# Patient Record
Sex: FEMALE | Race: WHITE | HISPANIC OR LATINO | URBAN - METROPOLITAN AREA
[De-identification: names, ages, dates, MRNs, and addresses within clinical notes are randomized per-mention and may not be internally consistent; named-entity substitution may affect disease eponyms.]

---

## 2017-02-08 ENCOUNTER — APPOINTMENT (RX ONLY)
Dept: URBAN - METROPOLITAN AREA CLINIC 349 | Facility: CLINIC | Age: 57
Setting detail: DERMATOLOGY
End: 2017-02-08

## 2017-02-08 DIAGNOSIS — L21.8 OTHER SEBORRHEIC DERMATITIS: ICD-10-CM | Status: STABLE

## 2017-02-08 DIAGNOSIS — L82.0 INFLAMED SEBORRHEIC KERATOSIS: ICD-10-CM

## 2017-02-08 PROBLEM — L29.8 OTHER PRURITUS: Status: ACTIVE | Noted: 2017-02-08

## 2017-02-08 PROCEDURE — ? PRESCRIPTION

## 2017-02-08 PROCEDURE — 99213 OFFICE O/P EST LOW 20 MIN: CPT | Mod: 25

## 2017-02-08 PROCEDURE — ? LIQUID NITROGEN

## 2017-02-08 PROCEDURE — 17110 DESTRUCTION B9 LES UP TO 14: CPT

## 2017-02-08 PROCEDURE — ? COUNSELING

## 2017-02-08 PROCEDURE — ? RECOMMENDATIONS

## 2017-02-08 RX ORDER — CEPHALEXIN 500 MG/1
CAPSULE ORAL
Qty: 60 | Refills: 0 | Status: ERX | COMMUNITY
Start: 2017-02-08

## 2017-02-08 RX ORDER — CLOBETASOL PROPIONATE 0.46 MG/ML
SOLUTION TOPICAL
Qty: 1 | Refills: 4 | Status: ERX

## 2017-02-08 RX ORDER — ALCLOMETASONE DIPROPIONATE 0.5 MG/G
OINTMENT TOPICAL
Qty: 1 | Refills: 6 | Status: ERX | COMMUNITY
Start: 2017-02-08

## 2017-02-08 RX ADMIN — ALCLOMETASONE DIPROPIONATE: 0.5 OINTMENT TOPICAL at 00:00

## 2017-02-08 RX ADMIN — CEPHALEXIN: 500 CAPSULE ORAL at 00:00

## 2017-02-08 ASSESSMENT — LOCATION ZONE DERM: LOCATION ZONE: ARM

## 2017-02-08 ASSESSMENT — LOCATION DETAILED DESCRIPTION DERM
LOCATION DETAILED: LEFT PROXIMAL DORSAL FOREARM
LOCATION DETAILED: LEFT DISTAL DORSAL FOREARM

## 2017-02-08 ASSESSMENT — LOCATION SIMPLE DESCRIPTION DERM: LOCATION SIMPLE: LEFT FOREARM

## 2017-02-08 NOTE — PROCEDURE: LIQUID NITROGEN
Detail Level: Detailed
Duration Of Freeze Thaw-Cycle (Seconds): 3
Medical Necessity Information: It is in your best interest to select a reason for this procedure from the list below. All of these items fulfill various CMS LCD requirements except the new and changing color options.
Include Z78.9 (Other Specified Conditions Influencing Health Status) As An Associated Diagnosis?: Yes
Render Post-Care Instructions In Note?: no
Post-Care Instructions: I reviewed with the patient in detail post-care instructions. Patient is to wear sunprotection, and avoid picking at any of the treated lesions. Pt may apply Vaseline to crusted or scabbing areas.
Number Of Freeze-Thaw Cycles: 1 freeze-thaw cycle
Consent: The patient's consent was obtained including but not limited to risks of crusting, scabbing, blistering, scarring, darker or lighter pigmentary change, recurrence, incomplete removal and infection.
Medical Necessity Clause: This procedure was medically necessary because the lesions that were treated were:

## 2017-02-08 NOTE — PROCEDURE: RECOMMENDATIONS
Recommendation Preamble: Reassurance
Detail Level: Zone
Recommendations (Free Text): Head and shoulders with T gel alternating

## 2017-03-20 ENCOUNTER — APPOINTMENT (RX ONLY)
Dept: URBAN - METROPOLITAN AREA CLINIC 349 | Facility: CLINIC | Age: 57
Setting detail: DERMATOLOGY
End: 2017-03-20

## 2017-03-20 DIAGNOSIS — L21.8 OTHER SEBORRHEIC DERMATITIS: ICD-10-CM | Status: IMPROVED

## 2017-03-20 DIAGNOSIS — L82.1 OTHER SEBORRHEIC KERATOSIS: ICD-10-CM

## 2017-03-20 DIAGNOSIS — L82.0 INFLAMED SEBORRHEIC KERATOSIS: ICD-10-CM

## 2017-03-20 PROCEDURE — 17110 DESTRUCTION B9 LES UP TO 14: CPT

## 2017-03-20 PROCEDURE — ? LIQUID NITROGEN

## 2017-03-20 PROCEDURE — 99213 OFFICE O/P EST LOW 20 MIN: CPT | Mod: 25

## 2017-03-20 PROCEDURE — ? TREATMENT REGIMEN

## 2017-03-20 PROCEDURE — ? COUNSELING

## 2017-03-20 ASSESSMENT — LOCATION SIMPLE DESCRIPTION DERM
LOCATION SIMPLE: LEFT FOREHEAD
LOCATION SIMPLE: RIGHT UPPER BACK
LOCATION SIMPLE: RIGHT THIGH
LOCATION SIMPLE: FRONTAL SCALP
LOCATION SIMPLE: CHEST
LOCATION SIMPLE: LEFT UPPER ARM
LOCATION SIMPLE: POSTERIOR SCALP
LOCATION SIMPLE: RIGHT THIGH
LOCATION SIMPLE: LEFT UPPER BACK
LOCATION SIMPLE: CHEST

## 2017-03-20 ASSESSMENT — LOCATION DETAILED DESCRIPTION DERM
LOCATION DETAILED: LEFT SUPERIOR MEDIAL FOREHEAD
LOCATION DETAILED: LEFT ANTECUBITAL SKIN
LOCATION DETAILED: LOWER STERNUM
LOCATION DETAILED: RIGHT ANTERIOR PROXIMAL THIGH
LOCATION DETAILED: LOWER STERNUM
LOCATION DETAILED: RIGHT ANTERIOR PROXIMAL THIGH
LOCATION DETAILED: MEDIAL FRONTAL SCALP
LOCATION DETAILED: MIDDLE STERNUM
LOCATION DETAILED: RIGHT INFERIOR UPPER BACK
LOCATION DETAILED: LEFT MID-UPPER BACK
LOCATION DETAILED: RIGHT OCCIPITAL SCALP

## 2017-03-20 ASSESSMENT — LOCATION ZONE DERM
LOCATION ZONE: ARM
LOCATION ZONE: TRUNK
LOCATION ZONE: SCALP
LOCATION ZONE: TRUNK
LOCATION ZONE: LEG
LOCATION ZONE: FACE
LOCATION ZONE: LEG

## 2017-03-20 NOTE — PROCEDURE: LIQUID NITROGEN
Detail Level: Detailed
Medical Necessity Clause: This procedure was medically necessary because the lesions that were treated were:
Render Post-Care Instructions In Note?: no
Number Of Freeze-Thaw Cycles: 1 freeze-thaw cycle
Post-Care Instructions: I reviewed with the patient in detail post-care instructions. Patient is to wear sunprotection, and avoid picking at any of the treated lesions. Pt may apply Vaseline to crusted or scabbing areas.
Medical Necessity Information: It is in your best interest to select a reason for this procedure from the list below. All of these items fulfill various CMS LCD requirements except the new and changing color options.
Include Z78.9 (Other Specified Conditions Influencing Health Status) As An Associated Diagnosis?: Yes
Consent: The patient's consent was obtained including but not limited to risks of crusting, scabbing, blistering, scarring, darker or lighter pigmentary change, recurrence, incomplete removal and infection.

## 2017-12-29 ENCOUNTER — APPOINTMENT (RX ONLY)
Dept: URBAN - METROPOLITAN AREA CLINIC 349 | Facility: CLINIC | Age: 57
Setting detail: DERMATOLOGY
End: 2017-12-29

## 2017-12-29 DIAGNOSIS — L30.9 DERMATITIS, UNSPECIFIED: ICD-10-CM

## 2017-12-29 DIAGNOSIS — L29.89 OTHER PRURITUS: ICD-10-CM

## 2017-12-29 DIAGNOSIS — L29.8 OTHER PRURITUS: ICD-10-CM

## 2017-12-29 PROCEDURE — ? PRESCRIPTION

## 2017-12-29 PROCEDURE — 99213 OFFICE O/P EST LOW 20 MIN: CPT

## 2017-12-29 PROCEDURE — ? OTHER

## 2017-12-29 PROCEDURE — ? COUNSELING

## 2017-12-29 RX ORDER — TRIAMCINOLONE ACETONIDE 1 MG/G
CREAM TOPICAL
Qty: 1 | Refills: 1 | Status: ERX | COMMUNITY
Start: 2017-12-29

## 2017-12-29 RX ADMIN — TRIAMCINOLONE ACETONIDE: 1 CREAM TOPICAL at 13:50

## 2017-12-29 ASSESSMENT — LOCATION SIMPLE DESCRIPTION DERM
LOCATION SIMPLE: LEFT THIGH
LOCATION SIMPLE: LEFT LOWER BACK
LOCATION SIMPLE: RIGHT UPPER BACK
LOCATION SIMPLE: RIGHT THIGH
LOCATION SIMPLE: ABDOMEN

## 2017-12-29 ASSESSMENT — LOCATION DETAILED DESCRIPTION DERM
LOCATION DETAILED: PERIUMBILICAL SKIN
LOCATION DETAILED: LEFT SUPERIOR LATERAL MIDBACK
LOCATION DETAILED: LEFT ANTERIOR PROXIMAL THIGH
LOCATION DETAILED: RIGHT ANTERIOR PROXIMAL THIGH
LOCATION DETAILED: RIGHT INFERIOR UPPER BACK
LOCATION DETAILED: RIGHT LATERAL ABDOMEN

## 2017-12-29 ASSESSMENT — LOCATION ZONE DERM
LOCATION ZONE: TRUNK
LOCATION ZONE: LEG

## 2017-12-29 NOTE — PROCEDURE: OTHER
Detail Level: Zone
Note Text (......Xxx Chief Complaint.): This diagnosis correlates with the
Other (Free Text): Patient advised to apply Lubiderm lotion on top of the Triamcinalone twice daily for two weeks and to use all free and clear detergent to wash her clothes in.

## 2018-03-07 ENCOUNTER — APPOINTMENT (RX ONLY)
Dept: URBAN - METROPOLITAN AREA CLINIC 349 | Facility: CLINIC | Age: 58
Setting detail: DERMATOLOGY
End: 2018-03-07

## 2018-03-07 DIAGNOSIS — L71.8 OTHER ROSACEA: ICD-10-CM

## 2018-03-07 PROCEDURE — ? COUNSELING

## 2018-03-07 PROCEDURE — ? PRESCRIPTION

## 2018-03-07 PROCEDURE — 99213 OFFICE O/P EST LOW 20 MIN: CPT

## 2018-03-07 RX ORDER — MINOCYCLINE HYDROCHLORIDE 100 MG/1
CAPSULE ORAL
Qty: 30 | Refills: 1 | Status: ERX | COMMUNITY
Start: 2018-03-07

## 2018-03-07 RX ORDER — METRONIDAZOLE 7.5 MG/G
CREAM TOPICAL
Qty: 1 | Refills: 4 | Status: ERX | COMMUNITY
Start: 2018-03-07

## 2018-03-07 RX ADMIN — MINOCYCLINE HYDROCHLORIDE: 100 CAPSULE ORAL at 20:50

## 2018-03-07 RX ADMIN — METRONIDAZOLE: 7.5 CREAM TOPICAL at 20:49

## 2018-03-07 NOTE — HPI: SKIN LESIONS
Is This A New Presentation, Or A Follow-Up?: Skin Lesions
Additional History: Patient states Alclometasone didn’t help

## 2022-06-07 DIAGNOSIS — N95.1 MENOPAUSE SYNDROME: Primary | ICD-10-CM

## 2023-08-02 ENCOUNTER — TELEPHONE (OUTPATIENT)
Dept: ORTHOPEDIC SURGERY | Age: 63
End: 2023-08-02

## 2023-08-21 ENCOUNTER — OFFICE VISIT (OUTPATIENT)
Dept: ORTHOPEDIC SURGERY | Age: 63
End: 2023-08-21
Payer: COMMERCIAL

## 2023-08-21 DIAGNOSIS — S43.431A SUPERIOR GLENOID LABRUM LESION OF RIGHT SHOULDER, INITIAL ENCOUNTER: ICD-10-CM

## 2023-08-21 DIAGNOSIS — M47.812 CERVICAL SPONDYLOSIS: ICD-10-CM

## 2023-08-21 DIAGNOSIS — M75.21 BICIPITAL TENDINITIS OF RIGHT SHOULDER: ICD-10-CM

## 2023-08-21 DIAGNOSIS — M75.01 ADHESIVE CAPSULITIS OF RIGHT SHOULDER: ICD-10-CM

## 2023-08-21 DIAGNOSIS — M19.012 DEGENERATIVE JOINT DISEASE OF LEFT ACROMIOCLAVICULAR JOINT: ICD-10-CM

## 2023-08-21 DIAGNOSIS — M19.011 DEGENERATIVE JOINT DISEASE OF RIGHT ACROMIOCLAVICULAR JOINT: ICD-10-CM

## 2023-08-21 DIAGNOSIS — G89.29 CHRONIC LEFT SHOULDER PAIN: ICD-10-CM

## 2023-08-21 DIAGNOSIS — M25.512 CHRONIC LEFT SHOULDER PAIN: ICD-10-CM

## 2023-08-21 DIAGNOSIS — M75.31 CALCIFIC TENDINITIS OF RIGHT SHOULDER: Primary | ICD-10-CM

## 2023-08-21 PROCEDURE — 99204 OFFICE O/P NEW MOD 45 MIN: CPT | Performed by: ORTHOPAEDIC SURGERY

## 2023-08-21 NOTE — PROGRESS NOTES
Name: Luis Fernando Rahman  YOB: 1960  Gender: female  MRN: [id number]      What: Bilateral shoulder pain right greater than left, neck pain  How: Insidious onset  When: November 2022    Self-referred second opinion    HPI: Luis Fernando Rahman is a 65 y.o. right-hand-dominant female seen for a second opinion for right greater than left shoulder pain. She also has neck pain. She has a history of hypothyroidism and is currently on hormonal replacement therapy. She denies any previous shoulder problems. She denies any injury. She noted in November 2020 to the onset of right shoulder pain. The right shoulder is sore painful and slightly stiff. She also developed left shoulder pain. She initially seen by Dr. Claudeen Oram. She received a right shoulder subacromial injection without improvement. She was also given oral steroids. She was put through a course of physical therapy without improvement. She then saw Dr. Dominique Bernal. She had both shoulders injected. The left shoulder has gotten better. The right shoulder has not improved at all. It affects her quality of life. She has pain in the overhead position, pain at night, pain that wakes her up at night and pain when she goes behind her back to unsnap her bra. She is very active. She is [place] but she is attempting to get yoga certified in [place] in February 2024. She rides bikes. The right shoulder affects her quality of life. She had a previous left clavicle fracture      ROS/Meds/PSH/PMH/FH/SH: A ten system review of systems was performed and is negative other than what is in the HPI. Tobacco:  reports that she has never smoked. She has never used smokeless tobacco.  There were no vitals taken for this visit.      Physical Examination:  She is an awake alert pleasant female ambulating without difficulty  She has a slight restriction in cervical spine range of motion without radicular findings    The left shoulder has 0 to 180 degrees of

## 2023-08-26 DIAGNOSIS — M75.01 ADHESIVE CAPSULITIS OF RIGHT SHOULDER: ICD-10-CM

## 2023-08-26 DIAGNOSIS — S43.431A SUPERIOR GLENOID LABRUM LESION OF RIGHT SHOULDER, INITIAL ENCOUNTER: ICD-10-CM

## 2023-08-26 DIAGNOSIS — M75.31 CALCIFIC TENDINITIS OF RIGHT SHOULDER: Primary | ICD-10-CM

## 2023-08-26 DIAGNOSIS — M75.21 BICIPITAL TENDINITIS OF RIGHT SHOULDER: ICD-10-CM

## 2023-08-26 DIAGNOSIS — M19.011 DEGENERATIVE JOINT DISEASE OF RIGHT ACROMIOCLAVICULAR JOINT: ICD-10-CM

## 2023-08-28 PROBLEM — M75.31 CALCIFIC TENDINITIS OF RIGHT SHOULDER: Status: ACTIVE | Noted: 2023-08-28

## 2023-08-28 PROBLEM — M75.01 ADHESIVE CAPSULITIS OF RIGHT SHOULDER: Status: ACTIVE | Noted: 2023-08-28

## 2023-08-28 PROBLEM — M19.011 DEGENERATIVE JOINT DISEASE OF RIGHT ACROMIOCLAVICULAR JOINT: Status: ACTIVE | Noted: 2023-08-28

## 2023-08-28 PROBLEM — M75.21 BICIPITAL TENDINITIS OF RIGHT SHOULDER: Status: ACTIVE | Noted: 2023-08-28

## 2023-08-28 PROBLEM — S43.431A SUPERIOR GLENOID LABRUM LESION OF RIGHT SHOULDER: Status: ACTIVE | Noted: 2023-08-28

## 2023-09-24 NOTE — H&P
Subjective:     Patient is a 65 y.o. RHD FEMALE WITH RIGHT SHOULDER PAIN. SEE OFFICE NOTE. Patient Active Problem List    Diagnosis Date Noted    Calcific tendinitis of right shoulder 08/28/2023    Bicipital tendinitis of right shoulder 08/28/2023    Superior glenoid labrum lesion of right shoulder 08/28/2023    Adhesive capsulitis of right shoulder 08/28/2023    Degenerative joint disease of right acromioclavicular joint 08/28/2023    Hypothyroidism 06/01/2016     Past Medical History:   Diagnosis Date    ASCUS with positive high risk HPV cervical     Hepatitis A     age 12    Hypothyroidism 6/1/2016    Liver disease     Screening for colon cancer 6/20/2014      Past Surgical History:   Procedure Laterality Date    COLONOSCOPY  7/2014    REPEAT 10 YRS    COLPOSCOPY      9/29/20 for LGSIL    GYN      treatment on cervix years ago    ORTHOPEDIC SURGERY      right arm pins and then removed     OTHER SURGICAL HISTORY      Hemorrhoids      Prior to Admission medications    Medication Sig Start Date End Date Taking? Authorizing Provider   EST ESTROGENS-METHYLTEST HS 0.625-1.25 MG per tablet TAKE ONE TABLET BY MOUTH ONE TIME DAILY . MAXIMUM DAILY AMOUNT : 1 TABLET 6/8/22   Surjit Saldivar MD   estradiol (ESTRACE) 0.1 MG/GM vaginal cream Place 1 g vaginally Twice a Week 11/5/21   Ar Automatic Reconciliation   Meth-Hyo-M Bl-Na Phos-Ph Sal (URIBEL) 118 MG CAPS Take 1 capsule by mouth 4 times daily 11/4/21   Ar Automatic Reconciliation   progesterone (PROMETRIUM) 100 MG CAPS capsule TAKE ONE CAPSULE BY MOUTH ONE TIME DAILY 11/4/21   Ar Automatic Reconciliation     Not on File   Social History     Tobacco Use    Smoking status: Never    Smokeless tobacco: Never   Substance Use Topics    Alcohol use: Yes     Alcohol/week: 4.2 standard drinks of alcohol      Family History   Problem Relation Age of Onset    Heart Disease Father       Review of Systems  Pertinent items are noted in HPI.     Objective:     No data

## 2023-09-26 ENCOUNTER — APPOINTMENT (RX ONLY)
Dept: URBAN - METROPOLITAN AREA CLINIC 329 | Facility: CLINIC | Age: 63
Setting detail: DERMATOLOGY
End: 2023-09-26

## 2023-09-26 ENCOUNTER — PREP FOR PROCEDURE (OUTPATIENT)
Dept: ORTHOPEDIC SURGERY | Age: 63
End: 2023-09-26

## 2023-09-26 DIAGNOSIS — M75.31 CALCIFIC TENDINITIS OF RIGHT SHOULDER: Primary | ICD-10-CM

## 2023-09-26 DIAGNOSIS — D22 MELANOCYTIC NEVI: ICD-10-CM

## 2023-09-26 DIAGNOSIS — L65.9 NONSCARRING HAIR LOSS, UNSPECIFIED: ICD-10-CM | Status: UNCHANGED

## 2023-09-26 DIAGNOSIS — L81.4 OTHER MELANIN HYPERPIGMENTATION: ICD-10-CM

## 2023-09-26 DIAGNOSIS — L71.8 OTHER ROSACEA: ICD-10-CM | Status: INADEQUATELY CONTROLLED

## 2023-09-26 DIAGNOSIS — L72.0 EPIDERMAL CYST: ICD-10-CM | Status: INADEQUATELY CONTROLLED

## 2023-09-26 DIAGNOSIS — D18.0 HEMANGIOMA: ICD-10-CM

## 2023-09-26 DIAGNOSIS — L82.0 INFLAMED SEBORRHEIC KERATOSIS: ICD-10-CM | Status: INADEQUATELY CONTROLLED

## 2023-09-26 DIAGNOSIS — L82.1 OTHER SEBORRHEIC KERATOSIS: ICD-10-CM

## 2023-09-26 PROBLEM — D22.5 MELANOCYTIC NEVI OF TRUNK: Status: ACTIVE | Noted: 2023-09-26

## 2023-09-26 PROBLEM — D22.62 MELANOCYTIC NEVI OF LEFT UPPER LIMB, INCLUDING SHOULDER: Status: ACTIVE | Noted: 2023-09-26

## 2023-09-26 PROBLEM — D22.71 MELANOCYTIC NEVI OF RIGHT LOWER LIMB, INCLUDING HIP: Status: ACTIVE | Noted: 2023-09-26

## 2023-09-26 PROBLEM — D18.01 HEMANGIOMA OF SKIN AND SUBCUTANEOUS TISSUE: Status: ACTIVE | Noted: 2023-09-26

## 2023-09-26 PROCEDURE — ? TREATMENT REGIMEN

## 2023-09-26 PROCEDURE — ? ADDITIONAL NOTES

## 2023-09-26 PROCEDURE — 17110 DESTRUCTION B9 LES UP TO 14: CPT

## 2023-09-26 PROCEDURE — ? LIQUID NITROGEN

## 2023-09-26 PROCEDURE — ? BENIGN DESTRUCTION COSMETIC

## 2023-09-26 PROCEDURE — ? PRESCRIPTION

## 2023-09-26 PROCEDURE — 99204 OFFICE O/P NEW MOD 45 MIN: CPT | Mod: 25

## 2023-09-26 PROCEDURE — ? MDM - TREATMENT GOALS

## 2023-09-26 PROCEDURE — ? COUNSELING

## 2023-09-26 PROCEDURE — ? PRESCRIPTION MEDICATION MANAGEMENT

## 2023-09-26 RX ORDER — SODIUM CHLORIDE 9 MG/ML
INJECTION, SOLUTION INTRAVENOUS PRN
Status: CANCELLED | OUTPATIENT
Start: 2023-09-26

## 2023-09-26 RX ORDER — LEVOTHYROXINE SODIUM 88 UG/1
88 TABLET ORAL DAILY
COMMUNITY

## 2023-09-26 RX ORDER — SODIUM CHLORIDE 0.9 % (FLUSH) 0.9 %
5-40 SYRINGE (ML) INJECTION EVERY 12 HOURS SCHEDULED
Status: CANCELLED | OUTPATIENT
Start: 2023-09-26

## 2023-09-26 RX ORDER — METRONIDAZOLE 7.5 MG/G
GEL TOPICAL
Qty: 45 | Refills: 6 | Status: ACTIVE

## 2023-09-26 RX ORDER — SODIUM CHLORIDE 0.9 % (FLUSH) 0.9 %
5-40 SYRINGE (ML) INJECTION PRN
Status: CANCELLED | OUTPATIENT
Start: 2023-09-26

## 2023-09-26 ASSESSMENT — LOCATION DETAILED DESCRIPTION DERM
LOCATION DETAILED: EPIGASTRIC SKIN
LOCATION DETAILED: LEFT LATERAL SUPERIOR CHEST
LOCATION DETAILED: RIGHT MEDIAL FRONTAL SCALP
LOCATION DETAILED: LEFT SUPERIOR UPPER BACK
LOCATION DETAILED: RIGHT ANTERIOR DISTAL THIGH
LOCATION DETAILED: LEFT INFERIOR VERMILION BORDER
LOCATION DETAILED: RIGHT MEDIAL SUPERIOR CHEST
LOCATION DETAILED: LEFT LOWER CUTANEOUS LIP
LOCATION DETAILED: LEFT VENTRAL LATERAL DISTAL FOREARM
LOCATION DETAILED: RIGHT SUPERIOR MEDIAL UPPER BACK
LOCATION DETAILED: LEFT MEDIAL UPPER BACK
LOCATION DETAILED: LEFT SUPERIOR MEDIAL MIDBACK

## 2023-09-26 ASSESSMENT — LOCATION SIMPLE DESCRIPTION DERM
LOCATION SIMPLE: LEFT FOREARM
LOCATION SIMPLE: CHEST
LOCATION SIMPLE: RIGHT THIGH
LOCATION SIMPLE: LEFT LIP
LOCATION SIMPLE: LEFT LOWER BACK
LOCATION SIMPLE: RIGHT UPPER BACK
LOCATION SIMPLE: LEFT UPPER BACK
LOCATION SIMPLE: ABDOMEN
LOCATION SIMPLE: RIGHT SCALP
LOCATION SIMPLE: LEFT LOWER LIP

## 2023-09-26 ASSESSMENT — LOCATION ZONE DERM
LOCATION ZONE: SCALP
LOCATION ZONE: LIP
LOCATION ZONE: ARM
LOCATION ZONE: LEG
LOCATION ZONE: TRUNK

## 2023-09-26 NOTE — PROCEDURE: ADDITIONAL NOTES
Additional Notes: Discussed treatment with extraction, explained that this is a benign cosmetic destruction. Patient expressed understanding. Attempted to extract with an 11 blade, manual pressure and CTAs but was unsuccessful. May attempt again in the future
Render Risk Assessment In Note?: no
Detail Level: Simple
Additional Notes: Educated the patient that hair loss may be connected with her hypothyroidism and that by continuing her thyroid medication over an extended period of time, patient should notice an improvement. If the hair loss continues to worsen, discussed treatment options with patient.

## 2023-09-26 NOTE — HPI: SKIN LESION
What Type Of Note Output Would You Prefer (Optional)?: Bullet Format
How Severe Is Your Skin Lesion?: mild
Is This A New Presentation, Or A Follow-Up?: Skin Lesion
Additional History: Patent states that she has three areas of concern. One on her right cheek, as well as one on her lip and another lesion on the left side under her underarm. The lesions have been there for a while.

## 2023-09-26 NOTE — PERIOP NOTE
Patient verified name and . Order for consent IS found in EHR and matches case posting; patient verifies procedure. Type 1b surgery, phone assessment complete. Orders not received. Labs per surgeon: none in EHR at time of assessment  Labs per anesthesia protocol: none    Patient answered medical/surgical history questions at their best of ability. All prior to admission medications documented in EPIC. Patient instructed to take the following medications the day of surgery according to anesthesia guidelines with a small sip of water: Levothyroxine, estrogen, progesterone. On the day before surgery please take Tylenol (Acetaminophen) 1000mg in the morning and then again before bed. You may substitute for Tylenol 650 mg. Hold all vitamins 7 days prior to surgery and NSAIDS 5 days prior to surgery. Patient instructed on the following:    > Arrive at [facility], time of arrival to be called the day before by 1700  > NPO after midnight, unless otherwise indicated, including gum, mints, and ice chips  > Responsible adult must drive patient to the hospital, stay during surgery, and patient will need supervision 24 hours after anesthesia  > Use antibacterial soap in shower the night before surgery and on the morning of surgery  > All piercings must be removed prior to arrival.    > Leave all valuables (money and jewelry) at home but bring insurance card and ID on DOS.   > You may be required to pay a deductible or co-pay on the day of your procedure. You can pre-pay by calling [phone] if your surgery is at the [facility] or [phone] if your surgery is at the [facility]. > Do not wear make-up, nail polish, lotions, cologne, perfumes, powders, or oil on skin. Artificial nails are not permitted.

## 2023-09-26 NOTE — HPI: HAIR LOSS
How Did The Hair Loss Occur?: sudden in onset
How Severe Is Your Hair Loss?: mild
Additional History: Patient states she is struggling with hair loss. The hair loss started about 5 months ago and happened suddenly. Patient also got diagnosed with hypothyroidism and got prescribed medication around the same time as the hair loss started. Patient explained that she used to struggle with an oily scalp and got prescribed a medication for it.

## 2023-09-26 NOTE — PROCEDURE: LIQUID NITROGEN
Consent: The patient's consent was obtained including but not limited to risks of crusting, scabbing, blistering, scarring, darker or lighter pigmentary change, recurrence, incomplete removal and infection.
Medical Necessity Clause: This procedure was medically necessary because the lesions that were treated were:
Show Spray Paint Technique Variable?: Yes
Post-Care Instructions: I reviewed with the patient in detail post-care instructions. Patient is to wear sunprotection, and avoid picking at any of the treated lesions. Pt may apply Vaseline to crusted or scabbing areas.
Include Z78.9 (Other Specified Conditions Influencing Health Status) As An Associated Diagnosis?: No
Detail Level: Detailed
Spray Paint Text: The liquid nitrogen was applied to the skin utilizing a spray paint frosting technique.
Medical Necessity Information: It is in your best interest to select a reason for this procedure from the list below. All of these items fulfill various CMS LCD requirements except the new and changing color options.

## 2023-09-26 NOTE — PROCEDURE: BENIGN DESTRUCTION COSMETIC
Post-Care Instructions: I reviewed with the patient in detail post-care instructions. Patient is to wear sunprotection, and avoid picking at any of the treated lesions. Pt may apply Vaseline to crusted or scabbing areas.
Anesthesia Volume In Cc: 0.5
Detail Level: Detailed
Consent: The patient's consent was obtained including but not limited to risks of crusting, scabbing, blistering, scarring, darker or lighter pigmentary change, recurrence, incomplete removal and infection.
Price (Use Numbers Only, No Special Characters Or $): 75

## 2023-09-27 ENCOUNTER — ANESTHESIA EVENT (OUTPATIENT)
Dept: SURGERY | Age: 63
End: 2023-09-27
Payer: COMMERCIAL

## 2023-09-28 ENCOUNTER — ANESTHESIA (OUTPATIENT)
Dept: SURGERY | Age: 63
End: 2023-09-28
Payer: COMMERCIAL

## 2023-09-28 ENCOUNTER — HOSPITAL ENCOUNTER (OUTPATIENT)
Age: 63
Setting detail: OUTPATIENT SURGERY
Discharge: HOME | End: 2023-09-28
Attending: ORTHOPAEDIC SURGERY | Admitting: ORTHOPAEDIC SURGERY
Payer: COMMERCIAL

## 2023-09-28 ENCOUNTER — APPOINTMENT (OUTPATIENT)
Dept: GENERAL RADIOLOGY | Age: 63
End: 2023-09-28
Attending: ORTHOPAEDIC SURGERY
Payer: COMMERCIAL

## 2023-09-28 VITALS
BODY MASS INDEX: 20.3 KG/M2 | HEIGHT: 63 IN | HEART RATE: 70 BPM | SYSTOLIC BLOOD PRESSURE: 111 MMHG | WEIGHT: 114.6 LBS | DIASTOLIC BLOOD PRESSURE: 64 MMHG | TEMPERATURE: 97.4 F | OXYGEN SATURATION: 96 % | RESPIRATION RATE: 20 BRPM

## 2023-09-28 DIAGNOSIS — M75.21 BICIPITAL TENDINITIS OF RIGHT SHOULDER: ICD-10-CM

## 2023-09-28 DIAGNOSIS — M19.011 DEGENERATIVE JOINT DISEASE OF RIGHT ACROMIOCLAVICULAR JOINT: ICD-10-CM

## 2023-09-28 DIAGNOSIS — M75.31 CALCIFIC TENDINITIS OF RIGHT SHOULDER: ICD-10-CM

## 2023-09-28 DIAGNOSIS — M75.01 ADHESIVE CAPSULITIS OF RIGHT SHOULDER: ICD-10-CM

## 2023-09-28 LAB
EST. AVERAGE GLUCOSE BLD GHB EST-MCNC: 105 MG/DL
GLUCOSE BLD STRIP.AUTO-MCNC: 84 MG/DL (ref 65–100)
HBA1C MFR BLD: 5.3 % (ref 4.8–5.6)
SERVICE CMNT-IMP: NORMAL

## 2023-09-28 PROCEDURE — 6360000002 HC RX W HCPCS: Performed by: NURSE ANESTHETIST, CERTIFIED REGISTERED

## 2023-09-28 PROCEDURE — 64415 NJX AA&/STRD BRCH PLXS IMG: CPT | Performed by: ANESTHESIOLOGY

## 2023-09-28 PROCEDURE — 2500000003 HC RX 250 WO HCPCS: Performed by: ANESTHESIOLOGY

## 2023-09-28 PROCEDURE — 7100000010 HC PHASE II RECOVERY - FIRST 15 MIN: Performed by: ORTHOPAEDIC SURGERY

## 2023-09-28 PROCEDURE — 2580000003 HC RX 258: Performed by: ANESTHESIOLOGY

## 2023-09-28 PROCEDURE — 2709999900 HC NON-CHARGEABLE SUPPLY: Performed by: ORTHOPAEDIC SURGERY

## 2023-09-28 PROCEDURE — 3700000000 HC ANESTHESIA ATTENDED CARE: Performed by: ORTHOPAEDIC SURGERY

## 2023-09-28 PROCEDURE — 73030 X-RAY EXAM OF SHOULDER: CPT

## 2023-09-28 PROCEDURE — 7100000011 HC PHASE II RECOVERY - ADDTL 15 MIN: Performed by: ORTHOPAEDIC SURGERY

## 2023-09-28 PROCEDURE — 6360000002 HC RX W HCPCS: Performed by: ORTHOPAEDIC SURGERY

## 2023-09-28 PROCEDURE — 7100000000 HC PACU RECOVERY - FIRST 15 MIN: Performed by: ORTHOPAEDIC SURGERY

## 2023-09-28 PROCEDURE — 7100000001 HC PACU RECOVERY - ADDTL 15 MIN: Performed by: ORTHOPAEDIC SURGERY

## 2023-09-28 PROCEDURE — 6370000000 HC RX 637 (ALT 250 FOR IP): Performed by: ANESTHESIOLOGY

## 2023-09-28 PROCEDURE — 2500000003 HC RX 250 WO HCPCS: Performed by: NURSE ANESTHETIST, CERTIFIED REGISTERED

## 2023-09-28 PROCEDURE — 6360000002 HC RX W HCPCS: Performed by: ANESTHESIOLOGY

## 2023-09-28 PROCEDURE — 3600000004 HC SURGERY LEVEL 4 BASE: Performed by: ORTHOPAEDIC SURGERY

## 2023-09-28 PROCEDURE — C1713 ANCHOR/SCREW BN/BN,TIS/BN: HCPCS | Performed by: ORTHOPAEDIC SURGERY

## 2023-09-28 PROCEDURE — 82962 GLUCOSE BLOOD TEST: CPT

## 2023-09-28 PROCEDURE — 83036 HEMOGLOBIN GLYCOSYLATED A1C: CPT

## 2023-09-28 PROCEDURE — 3700000001 HC ADD 15 MINUTES (ANESTHESIA): Performed by: ORTHOPAEDIC SURGERY

## 2023-09-28 PROCEDURE — 3600000014 HC SURGERY LEVEL 4 ADDTL 15MIN: Performed by: ORTHOPAEDIC SURGERY

## 2023-09-28 PROCEDURE — A4216 STERILE WATER/SALINE, 10 ML: HCPCS | Performed by: ANESTHESIOLOGY

## 2023-09-28 PROCEDURE — 2500000003 HC RX 250 WO HCPCS: Performed by: ORTHOPAEDIC SURGERY

## 2023-09-28 DEVICE — 5.5MM PEEK ZIP SUTURE ANCHOR WITH ¿ CIRCLE TAPER NEEDLES, #2 FORCE FIBER
Type: IMPLANTABLE DEVICE | Site: SHOULDER | Status: FUNCTIONAL
Brand: PEEK ZIP

## 2023-09-28 DEVICE — ALPHAVENT SUTURE ANCHOR 4.75MM PEEK, SUTURE ANCHOR WITH 3 STRANDS 1.4MM XBRAID TT SUTURE TAPE WITH MO-6 TAPERED NEEDLES
Type: IMPLANTABLE DEVICE | Site: SHOULDER | Status: FUNCTIONAL
Brand: ALPHAVENT

## 2023-09-28 RX ORDER — SODIUM CHLORIDE 0.9 % (FLUSH) 0.9 %
5-40 SYRINGE (ML) INJECTION EVERY 12 HOURS SCHEDULED
Status: DISCONTINUED | OUTPATIENT
Start: 2023-09-28 | End: 2023-09-28 | Stop reason: HOSPADM

## 2023-09-28 RX ORDER — HYDROMORPHONE HYDROCHLORIDE 2 MG/1
2 TABLET ORAL EVERY 6 HOURS PRN
Qty: 40 TABLET | Refills: 0 | Status: SHIPPED | OUTPATIENT
Start: 2023-09-28 | End: 2023-10-08

## 2023-09-28 RX ORDER — OXYCODONE HYDROCHLORIDE 5 MG/1
5 TABLET ORAL PRN
Status: DISCONTINUED | OUTPATIENT
Start: 2023-09-28 | End: 2023-09-28 | Stop reason: HOSPADM

## 2023-09-28 RX ORDER — ONDANSETRON 2 MG/ML
INJECTION INTRAMUSCULAR; INTRAVENOUS PRN
Status: DISCONTINUED | OUTPATIENT
Start: 2023-09-28 | End: 2023-09-28 | Stop reason: SDUPTHER

## 2023-09-28 RX ORDER — SODIUM CHLORIDE 9 MG/ML
INJECTION, SOLUTION INTRAVENOUS PRN
Status: DISCONTINUED | OUTPATIENT
Start: 2023-09-28 | End: 2023-09-28 | Stop reason: HOSPADM

## 2023-09-28 RX ORDER — KETOROLAC TROMETHAMINE 10 MG/1
TABLET, FILM COATED ORAL
Qty: 20 TABLET | Refills: 0 | Status: SHIPPED | OUTPATIENT
Start: 2023-09-28

## 2023-09-28 RX ORDER — NEOSTIGMINE METHYLSULFATE 1 MG/ML
INJECTION, SOLUTION INTRAVENOUS PRN
Status: DISCONTINUED | OUTPATIENT
Start: 2023-09-28 | End: 2023-09-28 | Stop reason: SDUPTHER

## 2023-09-28 RX ORDER — SODIUM CHLORIDE 0.9 % (FLUSH) 0.9 %
5-40 SYRINGE (ML) INJECTION PRN
Status: DISCONTINUED | OUTPATIENT
Start: 2023-09-28 | End: 2023-09-28 | Stop reason: HOSPADM

## 2023-09-28 RX ORDER — SODIUM CHLORIDE, SODIUM LACTATE, POTASSIUM CHLORIDE, CALCIUM CHLORIDE 600; 310; 30; 20 MG/100ML; MG/100ML; MG/100ML; MG/100ML
INJECTION, SOLUTION INTRAVENOUS CONTINUOUS
Status: DISCONTINUED | OUTPATIENT
Start: 2023-09-28 | End: 2023-09-28 | Stop reason: HOSPADM

## 2023-09-28 RX ORDER — ROCURONIUM BROMIDE 10 MG/ML
INJECTION, SOLUTION INTRAVENOUS PRN
Status: DISCONTINUED | OUTPATIENT
Start: 2023-09-28 | End: 2023-09-28 | Stop reason: SDUPTHER

## 2023-09-28 RX ORDER — DEXAMETHASONE SODIUM PHOSPHATE 10 MG/ML
INJECTION, SOLUTION INTRAMUSCULAR; INTRAVENOUS
Status: COMPLETED | OUTPATIENT
Start: 2023-09-28 | End: 2023-09-28

## 2023-09-28 RX ORDER — PROMETHAZINE HYDROCHLORIDE 25 MG/1
25 TABLET ORAL EVERY 6 HOURS PRN
Qty: 20 TABLET | Refills: 0 | Status: SHIPPED | OUTPATIENT
Start: 2023-09-28

## 2023-09-28 RX ORDER — OXYCODONE HYDROCHLORIDE 5 MG/1
10 TABLET ORAL PRN
Status: DISCONTINUED | OUTPATIENT
Start: 2023-09-28 | End: 2023-09-28 | Stop reason: HOSPADM

## 2023-09-28 RX ORDER — FENTANYL CITRATE 50 UG/ML
100 INJECTION, SOLUTION INTRAMUSCULAR; INTRAVENOUS
Status: COMPLETED | OUTPATIENT
Start: 2023-09-28 | End: 2023-09-28

## 2023-09-28 RX ORDER — ACETAMINOPHEN 500 MG
1000 TABLET ORAL ONCE
Status: COMPLETED | OUTPATIENT
Start: 2023-09-28 | End: 2023-09-28

## 2023-09-28 RX ORDER — NALOXONE HYDROCHLORIDE 4 MG/.1ML
1 SPRAY NASAL PRN
Qty: 1 EACH | Refills: 0 | Status: SHIPPED | OUTPATIENT
Start: 2023-09-28

## 2023-09-28 RX ORDER — LIDOCAINE HYDROCHLORIDE AND EPINEPHRINE 10; 10 MG/ML; UG/ML
INJECTION, SOLUTION INFILTRATION; PERINEURAL PRN
Status: DISCONTINUED | OUTPATIENT
Start: 2023-09-28 | End: 2023-09-28 | Stop reason: ALTCHOICE

## 2023-09-28 RX ORDER — LIDOCAINE HYDROCHLORIDE 10 MG/ML
1 INJECTION, SOLUTION INFILTRATION; PERINEURAL
Status: COMPLETED | OUTPATIENT
Start: 2023-09-28 | End: 2023-09-28

## 2023-09-28 RX ORDER — PROCHLORPERAZINE EDISYLATE 5 MG/ML
5 INJECTION INTRAMUSCULAR; INTRAVENOUS
Status: DISCONTINUED | OUTPATIENT
Start: 2023-09-28 | End: 2023-09-28 | Stop reason: HOSPADM

## 2023-09-28 RX ORDER — LIDOCAINE HYDROCHLORIDE 20 MG/ML
INJECTION, SOLUTION EPIDURAL; INFILTRATION; INTRACAUDAL; PERINEURAL PRN
Status: DISCONTINUED | OUTPATIENT
Start: 2023-09-28 | End: 2023-09-28 | Stop reason: SDUPTHER

## 2023-09-28 RX ORDER — PROPOFOL 10 MG/ML
INJECTION, EMULSION INTRAVENOUS PRN
Status: DISCONTINUED | OUTPATIENT
Start: 2023-09-28 | End: 2023-09-28 | Stop reason: SDUPTHER

## 2023-09-28 RX ORDER — MIDAZOLAM HYDROCHLORIDE 2 MG/2ML
2 INJECTION, SOLUTION INTRAMUSCULAR; INTRAVENOUS
Status: COMPLETED | OUTPATIENT
Start: 2023-09-28 | End: 2023-09-28

## 2023-09-28 RX ORDER — ONDANSETRON 2 MG/ML
4 INJECTION INTRAMUSCULAR; INTRAVENOUS
Status: DISCONTINUED | OUTPATIENT
Start: 2023-09-28 | End: 2023-09-28 | Stop reason: HOSPADM

## 2023-09-28 RX ORDER — GLYCOPYRROLATE 0.2 MG/ML
INJECTION INTRAMUSCULAR; INTRAVENOUS PRN
Status: DISCONTINUED | OUTPATIENT
Start: 2023-09-28 | End: 2023-09-28 | Stop reason: SDUPTHER

## 2023-09-28 RX ADMIN — PHENYLEPHRINE HYDROCHLORIDE 50 MCG: 0.1 INJECTION, SOLUTION INTRAVENOUS at 07:28

## 2023-09-28 RX ADMIN — DEXAMETHASONE SODIUM PHOSPHATE 4 MG: 10 INJECTION, SOLUTION INTRAMUSCULAR; INTRAVENOUS at 06:25

## 2023-09-28 RX ADMIN — PHENYLEPHRINE HYDROCHLORIDE 50 MCG: 0.1 INJECTION, SOLUTION INTRAVENOUS at 07:22

## 2023-09-28 RX ADMIN — SODIUM CHLORIDE, SODIUM LACTATE, POTASSIUM CHLORIDE, AND CALCIUM CHLORIDE: 600; 310; 30; 20 INJECTION, SOLUTION INTRAVENOUS at 07:33

## 2023-09-28 RX ADMIN — Medication 1 MG: at 07:48

## 2023-09-28 RX ADMIN — ONDANSETRON 4 MG: 2 INJECTION INTRAMUSCULAR; INTRAVENOUS at 07:00

## 2023-09-28 RX ADMIN — Medication 2000 MG: at 06:44

## 2023-09-28 RX ADMIN — FENTANYL CITRATE 50 MCG: 50 INJECTION INTRAMUSCULAR; INTRAVENOUS at 06:27

## 2023-09-28 RX ADMIN — DEXAMETHASONE SODIUM PHOSPHATE 5 MG: 10 INJECTION, SOLUTION INTRAMUSCULAR; INTRAVENOUS at 07:00

## 2023-09-28 RX ADMIN — PHENYLEPHRINE HYDROCHLORIDE 50 MCG: 0.1 INJECTION, SOLUTION INTRAVENOUS at 07:16

## 2023-09-28 RX ADMIN — PHENYLEPHRINE HYDROCHLORIDE 50 MCG: 0.1 INJECTION, SOLUTION INTRAVENOUS at 07:25

## 2023-09-28 RX ADMIN — ROPIVACAINE HYDROCHLORIDE 30 ML: 5 INJECTION, SOLUTION EPIDURAL; INFILTRATION; PERINEURAL at 06:25

## 2023-09-28 RX ADMIN — GLYCOPYRROLATE 0.4 MG: 0.2 INJECTION INTRAMUSCULAR; INTRAVENOUS at 07:43

## 2023-09-28 RX ADMIN — LIDOCAINE HYDROCHLORIDE 20 MG: 20 INJECTION, SOLUTION EPIDURAL; INFILTRATION; INTRACAUDAL; PERINEURAL at 06:45

## 2023-09-28 RX ADMIN — LIDOCAINE HYDROCHLORIDE 1 ML: 10 INJECTION, SOLUTION INFILTRATION; PERINEURAL at 06:10

## 2023-09-28 RX ADMIN — Medication 3 MG: at 07:43

## 2023-09-28 RX ADMIN — ACETAMINOPHEN 1000 MG: 500 TABLET, FILM COATED ORAL at 06:05

## 2023-09-28 RX ADMIN — ROCURONIUM BROMIDE 30 MG: 50 INJECTION, SOLUTION INTRAVENOUS at 06:45

## 2023-09-28 RX ADMIN — PHENYLEPHRINE HYDROCHLORIDE 50 MCG: 0.1 INJECTION, SOLUTION INTRAVENOUS at 07:37

## 2023-09-28 RX ADMIN — PROPOFOL 130 MG: 10 INJECTION, EMULSION INTRAVENOUS at 06:45

## 2023-09-28 RX ADMIN — MIDAZOLAM 1 MG: 1 INJECTION INTRAMUSCULAR; INTRAVENOUS at 06:25

## 2023-09-28 RX ADMIN — ROCURONIUM BROMIDE 20 MG: 50 INJECTION, SOLUTION INTRAVENOUS at 07:00

## 2023-09-28 RX ADMIN — SODIUM CHLORIDE, SODIUM LACTATE, POTASSIUM CHLORIDE, AND CALCIUM CHLORIDE: 600; 310; 30; 20 INJECTION, SOLUTION INTRAVENOUS at 06:10

## 2023-09-28 ASSESSMENT — PAIN DESCRIPTION - DESCRIPTORS: DESCRIPTORS: ACHING

## 2023-09-28 ASSESSMENT — PAIN - FUNCTIONAL ASSESSMENT: PAIN_FUNCTIONAL_ASSESSMENT: 0-10

## 2023-09-28 NOTE — BRIEF OP NOTE
BRIEF OPERATIVE NOTE    Date of Procedure: 9/28/2023    Preoperative Diagnosis:  CALCIFIC TENDINITIS RIGHT  SHOULDER      BICEPS TENDINITIS RIGHT SHOULDER      SLAP TEAR RIGHT SHOULDER      ADHESIVE CAPSULITIS RIGHT SHOULDER      AC OA RIGHT SHOULDER    Postoperative Diagnosis:  SAME    Procedure(s):  EXAMINATION AND MANIPULATION RIGHT SHOULDER ARTHROSCOPY RIGHT SHOULDER ARTHROSCOPIC SUBACROMIAL DECOMPRESSION WITHOUT ACROMIOPLASTY,  DISTAL CLAVICLE RESECTION, LYSIS OF ADHESIONS, EXTENSIVE DEBRIDEMENT SLAP TEAR, BICEPS LABRAL COMPLEX, GLENOHUMERAL JOINT CAPSULE, SUBACROMIAL SPACE, CALCIFIC DEPOSITS, MINI OPEN BICEPS TENODESIS    Surgeon(s) and Role:     * Blue Gaitan MD - Primary         Assistant Staff:  NONE    Surgical Staff:  Circulator: Cecile Charles RN  Surgical Assistant: Tae Romano  Scrub Person First: Edi Archibald  Scrub Person Second: Kathryn Polo      * Missing procedure start or end time(s) *    Anesthesia:  GENERAL WITH INTERSCALENE BLOCK    Estimated Blood Loss: 30 CC. Complications: NONE    Implants:   Implant Name Type Inv. Item Serial No.  Lot No. LRB No. Used Action   ANCHOR SUT DIA5. 5MM PEEK ZIP W/ NDL - [id number]  ANCHOR SUT DIA5. 5MM PEEK ZIP W/ NDL  [employer] [id number] Right 1 Implanted   ALPHAVENT SUTURE ANCHOR 4.75MM PEEK    [employer] [id number] Right 1 Implanted       Blue Vieyra MD

## 2023-09-28 NOTE — ANESTHESIA POSTPROCEDURE EVALUATION
Department of Anesthesiology  Postprocedure Note    Patient: Dung Castillo  MRN: [id number]  YOB: 1960  Date of evaluation: 9/28/2023      Procedure Summary     Date: 09/28/23 Room / Location: [facility] [place] OR  / [facility] MAIN OR    Anesthesia Start: 0640 Anesthesia Stop: 4031    Procedure: EXAMINATION AND MANIPULATION RIGHT SHOULDER ARTHROSCOPY RIGHT SHOULDER ARTHROSCOPIC SUBACROMIAL DECOMPRESSION WITHOUT ACROMIOPLASTY,  DISTAL CLAVICLE RESECTION, LYSIS OF ADHESIONS, EXTENSIVE DEBRIDEMENT SLAP TEAR, BICEPS LABRAL COMPLEX, GLENOHUMERAL JOINT CAPSULE, SUBACROMIAL SPACE, CALCIFIC DEPOSITS, MINI OPEN BICEPS TENODESIS (Right: Shoulder) Diagnosis:       Calcific tendinitis of right shoulder      Bicipital tendinitis of right shoulder      Superior glenoid labrum lesion of right shoulder, initial encounter      Adhesive capsulitis of right shoulder      Degenerative joint disease of right acromioclavicular joint      (Calcific tendinitis of right shoulder [M75.31])      (Bicipital tendinitis of right shoulder [M75.21])      (Superior glenoid labrum lesion of right shoulder, initial encounter [G84.690M])      (Adhesive capsulitis of right shoulder [M75.01])      (Degenerative joint disease of right acromioclavicular joint [M19.011])    Surgeons: Shadi Rubio MD Responsible Provider: Yolanda Bustamante MD    Anesthesia Type: general ASA Status: 2          Anesthesia Type: No value filed.     Inessa Phase I: Inessa Score: 7    Inessa Phase II: Inessa Score: 9      Anesthesia Post Evaluation    Patient location during evaluation: PACU  Patient participation: complete - patient participated  Level of consciousness: awake and alert  Airway patency: patent  Nausea & Vomiting: no nausea and no vomiting  Complications: no  Cardiovascular status: hemodynamically stable  Respiratory status: acceptable, nonlabored ventilation and spontaneous ventilation  Hydration status: euvolemic  Comments: /64   Pulse 70   Temp

## 2023-09-28 NOTE — H&P
Update History & Physical    The patient's History and Physical of August 21, 2023 was reviewed with the patient and I examined the patient. There was no change. The surgical site was confirmed by the patient and me. Plan: The risks, benefits, expected outcome, and alternative to the recommended procedure have been discussed with the patient. Patient understands and wants to proceed with the procedure.      Electronically signed by Hazel Ramirez MD on 9/28/2023 at 6:12 AM

## 2023-09-28 NOTE — ANESTHESIA PRE PROCEDURE
Department of Anesthesiology  Preprocedure Note       Name:  Allan Felix   Age:  65 y.o.  :  1960                                          MRN:  [id number]         Date:  2023      Surgeon: Tena Stallworth):  Aileen Bedoya MD    Procedure: Procedure(s):  right shoulder exam under anesthesia and manipulation, right shoulder arthroscopy, arthroscopic subacromial decompression without acromioplasty, arthroscopic distal clavicle resection, lysis of adhesions, arthrosocpic versus mini open rotator cuff repair and biceps tenodesis. general/intersclane. Medications prior to admission:   Prior to Admission medications    Medication Sig Start Date End Date Taking? Authorizing Provider   HYDROmorphone (DILAUDID) 2 MG tablet Take 1 tablet by mouth every 6 hours as needed for Pain for up to 10 days. Max Daily Amount: 8 mg 9/28/23 10/8/23 Yes Aileen Bedoya MD   naloxone 4 MG/0.1ML LIQD nasal spray 1 spray by Nasal route as needed for Opioid Reversal 23  Yes Aileen Bedoya MD   promethazine (PHENERGAN) 25 MG tablet Take 1 tablet by mouth every 6 hours as needed for Nausea 23  Yes Aileen Bedoya MD   ketorolac (TORADOL) 10 MG tablet Take 1 tablet every 6 hours for 5 days post-op 23  Yes Aileen Bedoya MD   levothyroxine (SYNTHROID) 88 MCG tablet Take 1 tablet by mouth Daily   Yes Historical Provider, MD   EST ESTROGENS-METHYLTEST HS 0.625-1.25 MG per tablet TAKE ONE TABLET BY MOUTH ONE TIME DAILY .  MAXIMUM DAILY AMOUNT : 1 TABLET 22   Nancie Spears MD   estradiol (ESTRACE) 0.1 MG/GM vaginal cream Place 1 g vaginally Twice a Week 21   Ar Automatic Reconciliation   Meth-Hyo-M Bl-Na Phos-Ph Sal (URIBEL) 118 MG CAPS Take 1 capsule by mouth 4 times daily 21   Ar Automatic Reconciliation   progesterone (PROMETRIUM) 100 MG CAPS capsule TAKE ONE CAPSULE BY MOUTH ONE TIME DAILY 21   Ar Automatic Reconciliation       Current medications:    Current Facility-Administered

## 2023-09-28 NOTE — OP NOTE
modalities and electively admitted for operative intervention. PROCEDURE:  Following identification of the patient, the patient was taken to the operative suite. Following administration of general anesthesia, interscalene block for postop pain control, 2 g of IV Ancef, and measurement of hemoglobin A1c and fasting blood glucose 5.1 and 83 respectively, the patient was positioned on the operative table in the supine fashion. Right shoulder was examined under anesthesia. The patient was noted to have 0-150 degrees of passive forward elevation, 45 degrees of external rotation to the side, and 75 degrees of external and internal rotation in the 90-degree abducted position. At this point, a gentle manipulation of the right shoulder was then performed. I was able to achieve 0-180 degrees of passive forward elevation, 60 degrees of external rotation to the side, and 90 degrees of external and internal rotation in the 90-degree abducted position. At this point, the patient was carefully positioned in the lateral decubitus position left side down. Axillary roll was placed. Beanbag was inflated. Care was taken to pad both dependent lower and upper extremities. Right arm was then placed in the Prism Digital traction device in 15 pounds of traction. Right shoulder was prepped and draped in the sterile fashion. Subacromial space was then injected with 10 mL of 1% Xylocaine with epinephrine. Scope was introduced into the shoulder. Diagnostic arthroscopy was then commenced. Articular surfaces of the humeral head and glenoid were visualized and noted to be intact. Anterior, posterior, superior, and inferior labrum were visualized. There was a type 2 SLAP repair with an unstable biceps anchor along with biceps tendinopathy. Undersurface of rotator cuff was visualized in its entirety and intact. With use of 4.5 full resector, all adhesions were lysed. There was abundant mobility in the axillary recess.   Scope was

## 2023-09-28 NOTE — ANESTHESIA PROCEDURE NOTES
Perineural   30 mL - 9/28/2023 6:25:00 AM  dexamethasone (DECADRON) (PF) 10 mg/mL injection - Other   4 mg - 9/28/2023 6:25:00 AM

## 2023-09-29 ENCOUNTER — TELEPHONE (OUTPATIENT)
Dept: ORTHOPEDIC SURGERY | Age: 63
End: 2023-09-29

## 2023-09-29 NOTE — TELEPHONE ENCOUNTER
Called and spoke with the patient, went over post op instructions, instructed patient to call our office with any further needs/concerns/questions.

## 2023-09-29 NOTE — TELEPHONE ENCOUNTER
She had surgery yesterday. She said someone was supposed to call her with post op instructions. Please give her a call.

## 2023-10-02 ENCOUNTER — TELEPHONE (OUTPATIENT)
Dept: ORTHOPEDIC SURGERY | Age: 63
End: 2023-10-02

## 2023-10-02 DIAGNOSIS — M75.21 BICIPITAL TENDINITIS OF RIGHT SHOULDER: ICD-10-CM

## 2023-10-02 DIAGNOSIS — M75.01 ADHESIVE CAPSULITIS OF RIGHT SHOULDER: ICD-10-CM

## 2023-10-02 DIAGNOSIS — S43.431A SUPERIOR GLENOID LABRUM LESION OF RIGHT SHOULDER, INITIAL ENCOUNTER: ICD-10-CM

## 2023-10-02 DIAGNOSIS — M75.31 CALCIFIC TENDINITIS OF RIGHT SHOULDER: Primary | ICD-10-CM

## 2023-10-02 NOTE — TELEPHONE ENCOUNTER
She had surgery last week and was supposed to start PT today but hasnt heard from anyone. Please call.

## 2023-10-04 ENCOUNTER — OFFICE VISIT (OUTPATIENT)
Dept: ORTHOPEDIC SURGERY | Age: 63
End: 2023-10-04

## 2023-10-04 DIAGNOSIS — Z48.89 ENCOUNTER FOR POSTOPERATIVE CARE: Primary | ICD-10-CM

## 2023-10-04 PROCEDURE — 99024 POSTOP FOLLOW-UP VISIT: CPT | Performed by: ORTHOPAEDIC SURGERY

## 2023-10-04 RX ORDER — OXYCODONE HYDROCHLORIDE 5 MG/1
5 TABLET ORAL EVERY 6 HOURS PRN
Qty: 40 TABLET | Refills: 0 | Status: SHIPPED | OUTPATIENT
Start: 2023-10-04 | End: 2023-10-14

## 2023-10-04 NOTE — PROGRESS NOTES
Name: Shyla Hadley  YOB: 1960  Gender: female  MRN: [id number]          HPI: Shyla Hadley is a 65 y.o. right-hand-dominant female weeks status post EUA and manipulation right shoulder, arthroscopy right shoulder ASD without acromioplasty, ADCR, lysis of adhesions, extensive debridement SLAP tear, biceps labral complex, glenohumeral joint capsule, subacromial space, calcific deposits, mini open biceps tenodesis. She returns and is doing fantastic. The Dilaudid is too strong. ROS/Meds/PSH/PMH/FH/SH: A ten system review of systems was performed and is negative other than what is in the HPI. Tobacco:  reports that she has never smoked. She has never used smokeless tobacco.  There were no vitals taken for this visit. Physical Examination:  She is an awake alert pleasant female ambulating without difficulty  She has a slight restriction in cervical spine range of motion without radicular findings    The left shoulder has 0 to 180 degrees of active and 0 to 180 degrees passive forward elevation. Internal rotation is to T6. External rotation is to 60 degrees at the side. In the 90 degree abducted position 90 degrees of external and 90 degrees internal rotation  The AC joint is non-tender  SC joint is non-tender. Greater tuberosity is non-tender. negative biceps  Negative O'Briens sign  negative lift-off sign  Negative belly press sign  Negative bear huggers sign  negative drop sign  negative hornblower's sign  No posterior glenohumeral joint line tenderness. No evident excessive external rotation  Rotator cuff strength is 5/5.  negative external rotation stress test.   Negative empty can sign  There is no evident anterior or posterior apprehension with a negative sulcus sign. No instability  negative external and internal Rotation lag sign  Neurovascularly intact. The right shoulder incisions are intact. The dressing was changed.   Passive forward elevation is 0-1

## 2023-10-06 ENCOUNTER — HOSPITAL ENCOUNTER (OUTPATIENT)
Dept: PHYSICAL THERAPY | Age: 63
Setting detail: RECURRING SERIES
Discharge: HOME | End: 2023-10-09
Attending: ORTHOPAEDIC SURGERY
Payer: COMMERCIAL

## 2023-10-06 DIAGNOSIS — M25.611 STIFFNESS OF RIGHT SHOULDER, NOT ELSEWHERE CLASSIFIED: ICD-10-CM

## 2023-10-06 DIAGNOSIS — M25.511 RIGHT SHOULDER PAIN, UNSPECIFIED CHRONICITY: Primary | ICD-10-CM

## 2023-10-06 DIAGNOSIS — S43.431S LABRAL TEAR OF SHOULDER, RIGHT, SEQUELA: ICD-10-CM

## 2023-10-06 PROCEDURE — 97110 THERAPEUTIC EXERCISES: CPT

## 2023-10-06 PROCEDURE — 97161 PT EVAL LOW COMPLEX 20 MIN: CPT

## 2023-10-06 ASSESSMENT — PAIN SCALES - GENERAL: PAINLEVEL_OUTOF10: 0

## 2023-10-06 NOTE — PROGRESS NOTES
Jannet Lopez  : 1960  Primary: Yessica Palomino Sc (Melinda [facility]SE)  Secondary:  [address] @ [facility]  [address] [address]  Phone: [phone]  Fax: [phone] Plan Frequency: 1x/week for first 5 weeks, then plan for 1-2x/week for the next 6 weeks. Plan of Care/Certification Expiration Date: 24      >PT Visit Info:  Plan Frequency: 1x/week for first 5 weeks, then plan for 1-2x/week for the next 6 weeks. Plan of Care/Certification Expiration Date: 24  Total # of Visits to Date: 1  Progress Note Counter: 1      Visit Count:  1    OUTPATIENT PHYSICAL THERAPY: Treatment Note 10/6/2023       Episode  }Appt Desk             Treatment Diagnosis:    Visit Diagnoses         Codes    Right shoulder pain, unspecified chronicity    -  Primary M25.511    Stiffness of right shoulder, not elsewhere classified     M25.611    Labral tear of shoulder, right, sequela     S43.431S        Medical/Referring Diagnosis: s/p Examination and manipulation of right shoulder, arthroscopy of right shoulder, arthroscopic subacromial decompression without acromioplasty, arthroscopic distal clavicle resection, lysis of adhesions, extensive debridement of SLAP tear, biceps labral complex, glenohumeral joint capsule, subacromial space, calcific deposits, mini-open biceps tenodesis. Calcific tendinitis of right shoulder [M75.31]  Bicipital tendinitis of right shoulder [M75.21]  Superior glenoid labrum lesion of right shoulder, initial encounter [D99.159I]  Adhesive capsulitis of right shoulder [M75.01]  Referring Physician:  Sushil Lopez MD MD Orders:  PT eval & treat, home exercise program, and passive motion; 2x/week x6 weeks (10/2/23)  Return MD Appt:  10/8/23  Date of Onset:  Onset Date: 23     Allergies:   Patient has no known allergies. Restrictions/Precautions:  Restrictions/Precautions: Surgical protocol;  Other (comment) (ordered restrictions)  No data recorded

## 2023-10-06 NOTE — THERAPY EVALUATION
Ms. Robison Begin 1 week s/p examination and manipulation of right shoulder, arthroscopic subacromial decompression without acromioplasty, arthroscopic distal clavicle resection, lysis of adhesions, extensive debridement of a type 2 SLAP tear, biceps labral complex, glenohumeral joint capsule, subacromial space, and calcific deposits, and mini-open biceps tenodesis on 9/28/23. She appears to be doing very well at this time. There is pain to the shoulder. She has very good PROM. Post-op pain, swelling, wound healing, decreased ROM, and weakness are limiting normalized use and function of her R/dominant UE. She will benefit from PT for guided post-op shoulder rehab to promote safe return to normalized use of the UE. Problem List: (Impacting functional limitations): Body Structures, Functions, Activity Limitations Requiring Skilled Therapeutic Intervention: Decreased ADL status; Decreased ROM; Decreased strength; Increased pain     Therapy Prognosis:   Therapy Prognosis: Excellent     Initial Assessment Complexity:   Decision Making: Low Complexity    PLAN   Effective Dates: 10/6/23 TO Plan of Care/Certification Expiration Date: 01/04/24     Frequency/Duration: Plan Frequency: 1x/week for first 5 weeks, then plan for 1-2x/week for the next 6 weeks. Interventions Planned (Treatment may consist of any combination of the following):    Current Treatment Recommendations: ROM; Strengthening; Home exercise program; Manual; Modalities (dicated by MD orders and post op protocol.)     GOALS: (Goals have been discussed and agreed upon with patient.)  Short-Term Functional Goals: Time Frame: 5 weeks   Report no more than 3/10 intermittent pain to R shoulder with compensatory use during basic functional activities, and score less than 40% on the DASH. R shoulder PROM forward elevation greater than 150 degrees to progress into functional ranges.   Demonstrate good R shoulder isometric strength with manual testing to progress

## 2023-10-09 ENCOUNTER — HOSPITAL ENCOUNTER (OUTPATIENT)
Dept: PHYSICAL THERAPY | Age: 63
Setting detail: RECURRING SERIES
End: 2023-10-09
Attending: ORTHOPAEDIC SURGERY
Payer: COMMERCIAL

## 2023-10-09 NOTE — PROGRESS NOTES
not cause pain then, but her shoulder pain increased significantly a little later. Very painful over nignt and this morning. >Initial:   No VAS. Shoulder is painful. >Post Session:         Medications Last Reviewed:  10/9/2023  Updated Objective Findings:    Observation/Orthostatic Postural Assessment:  Sutures are intact and covered with bandages. No drainage noted. No new ecchymosis noted R shoulder and UE.  ROM:  PROM RUE (degrees)  R Shoulder Flex  (0-180): 125 (pain)  R Shoulder ABduction (0-180): 80 (with scapula stabilized)  R Shoulder Int Rotation  (0-70): 60 (stabilized at 45 abd, 45 at 60 abd)  R Shoulder Ext Rotation  (0-90): 70 (in shoulder neutral and 45 abd)    Treatment   No treatment. Treatment/Session Summary:    >Treatment Assessment: She is 10 days post op. Increased pain and some decreased shoulder PROM noted today. There is some muscle guarding tenderness to subscapularis and seferino minor/major. Appears to have muscle actlvation to the cuff with low level hold in shoulder neutral.  Communication/Consultation: Message sent to Dr. Mary Ann Kessler in the EMR . Equipment provided today:  None  Recommendations/Intent for next treatment session: She has a follow up with Dr. Mary Ann Kessler on Wednesday for re-check. We will plan to continue at 1x/week for now for PROM shoulder, update HEP, and modalities as needed.      >Total Treatment Billable Duration:  0 minutes visit cancelled by PT  Time In: 5919  Time Out: [address] [place] [address], PT       Charge Capture  }Post Session Pain  PT Visit 25 [facility]  MD Guidelines  Scanned Media  Benefits  MyChart    Future Appointments   Date Time Provider [address] [place] [address]   10/11/2023  3:15 PM Scarlett Bruner MD [facility]   10/20/2023  1:00 PM Fan [facility], [facility]

## 2023-10-10 ENCOUNTER — TELEPHONE (OUTPATIENT)
Dept: ORTHOPEDIC SURGERY | Age: 63
End: 2023-10-10

## 2023-10-10 DIAGNOSIS — Z48.89 ENCOUNTER FOR POSTOPERATIVE CARE: Primary | ICD-10-CM

## 2023-10-10 RX ORDER — OXYCODONE HYDROCHLORIDE 10 MG/1
10 TABLET ORAL EVERY 6 HOURS PRN
Qty: 40 TABLET | Refills: 0 | Status: SHIPPED | OUTPATIENT
Start: 2023-10-10 | End: 2023-10-20

## 2023-10-10 NOTE — TELEPHONE ENCOUNTER
Her  is calling again to let you know she is in severe pain and that it's urgent. They need to speak to someone regarding what can be done. She does have an appointment tomorrow but the pain is very bad.

## 2023-10-10 NOTE — TELEPHONE ENCOUNTER
The patient is now calling as well. She really needs to speak to someone. The pain keeps getting worse and she is very concerned.  I told her you are on the tello with patients but that I would send another message

## 2023-10-10 NOTE — TELEPHONE ENCOUNTER
I called and spoke with pt and her . Pt states she has developed severe pain in her shoulder. She is scheduled to come into the office tomorrow. Pt states that the Dilaudid did not help her pain at all and they never filled the Oxy 5. I spoke with AGP and rx was sent for Oxy 10 to [facility]. Pt instructed to discontinue Dilaudid and do not mix medications. She agreed and will follow up tomorrow.

## 2023-10-11 ENCOUNTER — OFFICE VISIT (OUTPATIENT)
Dept: ORTHOPEDIC SURGERY | Age: 63
End: 2023-10-11

## 2023-10-11 DIAGNOSIS — Z48.89 ENCOUNTER FOR POSTOPERATIVE CARE: Primary | ICD-10-CM

## 2023-10-11 PROCEDURE — 99024 POSTOP FOLLOW-UP VISIT: CPT | Performed by: ORTHOPAEDIC SURGERY

## 2023-10-11 RX ORDER — GABAPENTIN 100 MG/1
100 CAPSULE ORAL 3 TIMES DAILY
Qty: 90 CAPSULE | Refills: 0 | Status: SHIPPED | OUTPATIENT
Start: 2023-10-11 | End: 2023-11-10

## 2023-10-11 NOTE — PROGRESS NOTES
Name: Katie Henry  YOB: 1960  Gender: female  MRN: [id number]          HPI: Katie Henry is a 65 y.o. right-hand-dominant female 2 weeks status post EUA and manipulation right shoulder, arthroscopy right shoulder ASD without acromioplasty, ADCR, lysis of adhesions, extensive debridement SLAP tear, biceps labral complex, glenohumeral joint capsule, subacromial space, calcific deposits, mini open biceps tenodesis. She returns and noted the onset of right shoulder pain since I last saw her last week. She was switched from Dilaudid to oxycodone 5 mg tablets last week. She called yesterday and was given an Oxy 10 mg prescription. .    ROS/Meds/PSH/PMH/FH/SH: A ten system review of systems was performed and is negative other than what is in the HPI. Tobacco:  reports that she has never smoked. She has never used smokeless tobacco.  There were no vitals taken for this visit. Physical Examination:  She is an awake alert pleasant female ambulating without difficulty  She has a slight restriction in cervical spine range of motion without radicular findings    The left shoulder has 0 to 180 degrees of active and 0 to 180 degrees passive forward elevation. Internal rotation is to T6. External rotation is to 60 degrees at the side. In the 90 degree abducted position 90 degrees of external and 90 degrees internal rotation  The AC joint is non-tender  SC joint is non-tender. Greater tuberosity is non-tender. negative biceps  Negative O'Briens sign  negative lift-off sign  Negative belly press sign  Negative bear huggers sign  negative drop sign  negative hornblower's sign  No posterior glenohumeral joint line tenderness. No evident excessive external rotation  Rotator cuff strength is 5/5.  negative external rotation stress test.   Negative empty can sign  There is no evident anterior or posterior apprehension with a negative sulcus sign.    No instability  negative external and

## 2023-10-20 ENCOUNTER — HOSPITAL ENCOUNTER (OUTPATIENT)
Dept: PHYSICAL THERAPY | Age: 63
Setting detail: RECURRING SERIES
Discharge: HOME | End: 2023-10-23
Attending: ORTHOPAEDIC SURGERY
Payer: COMMERCIAL

## 2023-10-20 PROCEDURE — 97110 THERAPEUTIC EXERCISES: CPT

## 2023-10-20 NOTE — PROGRESS NOTES
post op. Very good shoulder PROM at this point. Still with pain, soreness to the shoulder. Communication/Consultation: Last MD office visit note and updated PT order from 10/11/23 were reviewed in the EMR. b  Equipment provided today:  None  Recommendations/Intent for next treatment session: We will plan to continue at 1x/week for now for re-check, PROM shoulder, and update HEP. She returns to Dr. Nayeli Lebron next week.      >Total Treatment Billable Duration:  30 minutes   Time In: 9394  Time Out: [facility], PT       Charge Capture  }Post Session Pain  PT Visit Info  [facility] Portal  MD Guidelines  Scanned Media  Benefits  MyChart    Future Appointments   Date Time Provider [address]   10/25/2023  2:45 PM Katja Bruner MD [facility]   11/3/2023  1:45 PM [facility]

## 2023-10-23 ENCOUNTER — TELEPHONE (OUTPATIENT)
Dept: ORTHOPEDIC SURGERY | Age: 63
End: 2023-10-23

## 2023-10-23 NOTE — TELEPHONE ENCOUNTER
Spoke with pt; pt unable to keep Wed 10/25 appt and requests to be seen this week if possible. Pt rescheduled for Tues 10/24 @ 9am. Pt agreeable.

## 2023-10-24 ENCOUNTER — OFFICE VISIT (OUTPATIENT)
Dept: ORTHOPEDIC SURGERY | Age: 63
End: 2023-10-24

## 2023-10-24 DIAGNOSIS — Z48.89 ENCOUNTER FOR POSTOPERATIVE CARE: Primary | ICD-10-CM

## 2023-10-24 PROCEDURE — 99024 POSTOP FOLLOW-UP VISIT: CPT | Performed by: ORTHOPAEDIC SURGERY

## 2023-10-24 NOTE — PROGRESS NOTES
Name: Rose Ashby  YOB: 1960  Gender: female  MRN: [id number]          HPI: Rose Ashby is a 65 y.o. right-hand-dominant female 4 weeks status post EUA and manipulation right shoulder, arthroscopy right shoulder ASD without acromioplasty, ADCR, lysis of adhesions, extensive debridement SLAP tear, biceps labral complex, glenohumeral joint capsule, subacromial space, calcific deposits, mini open biceps tenodesis. She returns and is doing better. ROS/Meds/PSH/PMH/FH/SH: A ten system review of systems was performed and is negative other than what is in the HPI. Tobacco:  reports that she has never smoked. She has never used smokeless tobacco.  There were no vitals taken for this visit. Physical Examination:  She is an awake alert pleasant female ambulating without difficulty  She has a slight restriction in cervical spine range of motion without radicular findings    The left shoulder has 0 to 180 degrees of active and 0 to 180 degrees passive forward elevation. Internal rotation is to T6. External rotation is to 60 degrees at the side. In the 90 degree abducted position 90 degrees of external and 90 degrees internal rotation  The AC joint is non-tender  SC joint is non-tender. Greater tuberosity is non-tender. negative biceps  Negative O'Briens sign  negative lift-off sign  Negative belly press sign  Negative bear huggers sign  negative drop sign  negative hornblower's sign  No posterior glenohumeral joint line tenderness. No evident excessive external rotation  Rotator cuff strength is 5/5.  negative external rotation stress test.   Negative empty can sign  There is no evident anterior or posterior apprehension with a negative sulcus sign. No instability  negative external and internal Rotation lag sign  Neurovascularly intact.       The right shoulder incisions have healed  Passive forward elevation is 0-1 80  ER to 40  Biceps has good cosmetic appearance  She is

## 2023-11-03 ENCOUNTER — HOSPITAL ENCOUNTER (OUTPATIENT)
Dept: PHYSICAL THERAPY | Age: 63
Setting detail: RECURRING SERIES
Discharge: HOME | End: 2023-11-06
Attending: ORTHOPAEDIC SURGERY
Payer: COMMERCIAL

## 2023-11-03 PROCEDURE — 97110 THERAPEUTIC EXERCISES: CPT

## 2023-11-03 NOTE — PROGRESS NOTES
Yuki Montemayor  : 1960  Primary: Jessica Murcia Sc (Melinda [facility])  Secondary:  [address] @ [facility]  [address] [address]  Phone: [phone]  Fax: [phone] Plan Frequency: 1x/week for first 5 weeks, then plan for 1-2x/week for the next 6 weeks. Plan of Care/Certification Expiration Date: 24      >PT Visit Info:  Plan Frequency: 1x/week for first 5 weeks, then plan for 1-2x/week for the next 6 weeks. Plan of Care/Certification Expiration Date: 24  Total # of Visits to Date: 3  Progress Note Counter: 3  Canceled Appointment: 1      Visit Count:  3    OUTPATIENT PHYSICAL THERAPY: Treatment Note 11/3/2023       Episode  }Appt Desk             Treatment Diagnosis:    Visit Diagnoses         Codes    Right shoulder pain, unspecified chronicity    -  Primary M25.511    Stiffness of right shoulder, not elsewhere classified     M25.611    Labral tear of shoulder, right, sequela     S43.431S        Medical/Referring Diagnosis: s/p Examination and manipulation of right shoulder, arthroscopy of right shoulder, arthroscopic subacromial decompression without acromioplasty, arthroscopic distal clavicle resection, lysis of adhesions, extensive debridement of SLAP tear, biceps labral complex, glenohumeral joint capsule, subacromial space, calcific deposits, mini-open biceps tenodesis. Calcific tendinitis of right shoulder [M75.31]  Bicipital tendinitis of right shoulder [M75.21]  Superior glenoid labrum lesion of right shoulder, initial encounter [T12.828E]  Adhesive capsulitis of right shoulder [M75.01]  Referring Physician:  Shan Julien MD MD Orders:  PT eval & treat, home exercise program, and passive motion; 2x/week x2 weeks (10/11/23)  Return MD Appt: 23  Date of Onset:  Onset Date: 23     Allergies:   Patient has no known allergies. Restrictions/Precautions:  Restrictions/Precautions: Surgical protocol;  Other (comment) (ordered

## 2023-11-14 ENCOUNTER — OFFICE VISIT (OUTPATIENT)
Dept: ORTHOPEDIC SURGERY | Age: 63
End: 2023-11-14

## 2023-11-14 DIAGNOSIS — Z48.89 ENCOUNTER FOR POSTOPERATIVE CARE: Primary | ICD-10-CM

## 2023-11-14 PROCEDURE — 99024 POSTOP FOLLOW-UP VISIT: CPT | Performed by: ORTHOPAEDIC SURGERY

## 2023-11-14 NOTE — PROGRESS NOTES
Name: Mitchell Dunham  YOB: 1960  Gender: female  MRN: [id number]          HPI: Mitchell Dunham is a 65 y.o. right-hand-dominant female 6 weeks status post EUA and manipulation right shoulder, arthroscopy right shoulder ASD without acromioplasty, ADCR, lysis of adhesions, extensive debridement SLAP tear, biceps labral complex, glenohumeral joint capsule, subacromial space, calcific deposits, mini open biceps tenodesis. She returns and is doing better. ROS/Meds/PSH/PMH/FH/SH: A ten system review of systems was performed and is negative other than what is in the HPI. Tobacco:  reports that she has never smoked. She has never used smokeless tobacco.  There were no vitals taken for this visit. Physical Examination:  She is an awake alert pleasant female ambulating without difficulty  She has a slight restriction in cervical spine range of motion without radicular findings    The left shoulder has 0 to 180 degrees of active and 0 to 180 degrees passive forward elevation. Internal rotation is to T6. External rotation is to 60 degrees at the side. In the 90 degree abducted position 90 degrees of external and 90 degrees internal rotation  The AC joint is non-tender  SC joint is non-tender. Greater tuberosity is non-tender. negative biceps  Negative O'Briens sign  negative lift-off sign  Negative belly press sign  Negative bear huggers sign  negative drop sign  negative hornblower's sign  No posterior glenohumeral joint line tenderness. No evident excessive external rotation  Rotator cuff strength is 5/5.  negative external rotation stress test.   Negative empty can sign  There is no evident anterior or posterior apprehension with a negative sulcus sign. No instability  negative external and internal Rotation lag sign  Neurovascularly intact.       The right shoulder incisions have healed  Active forward elevation is 0-1 40  Passive forward elevation is 0-1 80  ER to 40  IR to

## 2023-11-17 ENCOUNTER — HOSPITAL ENCOUNTER (OUTPATIENT)
Dept: PHYSICAL THERAPY | Age: 63
Setting detail: RECURRING SERIES
Discharge: HOME | End: 2023-11-20
Attending: ORTHOPAEDIC SURGERY
Payer: COMMERCIAL

## 2023-11-17 PROCEDURE — 97110 THERAPEUTIC EXERCISES: CPT

## 2023-11-17 NOTE — PROGRESS NOTES
Singh Dear  : 1960  Primary: Ziyad Juan Bañuelos (Melinda [facility])  Secondary:  [address] @ [facility]  [address] [address]  Phone: [phone]  Fax: [phone] Plan Frequency: 1x/week for first 5 weeks, then plan for 1-2x/week for the next 6 weeks. Plan of Care/Certification Expiration Date: 24      >PT Visit Info:  Plan Frequency: 1x/week for first 5 weeks, then plan for 1-2x/week for the next 6 weeks. Plan of Care/Certification Expiration Date: 24  Total # of Visits to Date: 4  Progress Note Counter: 4  Canceled Appointment: 1      Visit Count:  4    OUTPATIENT PHYSICAL THERAPY: Treatment Note 2023       Episode  }Appt Desk             Treatment Diagnosis:    Visit Diagnoses         Codes    Right shoulder pain, unspecified chronicity    -  Primary M25.511    Stiffness of right shoulder, not elsewhere classified     M25.611    Labral tear of shoulder, right, sequela     S43.431S        Medical/Referring Diagnosis: s/p Examination and manipulation of right shoulder, arthroscopy of right shoulder, arthroscopic subacromial decompression without acromioplasty, arthroscopic distal clavicle resection, lysis of adhesions, extensive debridement of SLAP tear, biceps labral complex, glenohumeral joint capsule, subacromial space, calcific deposits, mini-open biceps tenodesis. Calcific tendinitis of right shoulder [M75.31]  Bicipital tendinitis of right shoulder [M75.21]  Superior glenoid labrum lesion of right shoulder, initial encounter [W90.608Q]  Adhesive capsulitis of right shoulder [M75.01]  Referring Physician:  Blue Gaitan MD MD Orders:  PT Evaluate & Treat- full motion and full strength ; 2x/week x5 weeks (23)  Return MD Appt: 1/3/24  Date of Onset:  Onset Date: 23     Allergies:   Patient has no known allergies. Restrictions/Precautions:  Restrictions/Precautions: Surgical protocol;  Other (comment) (ordered
scale, 5 representing the greatest disability. The scores of each section are added together for a total score of 55. Medical Necessity:   > She is 7 weeks post op R shoulder. She appears to be doing well at this point.  > Impairments listed above and post op precautions are limiting normal and use and activities using her R/dominant UE. Reason For Services/Other Comments:  > Patient continues to require skilled intervention due to complexity of the surgical procedure and need for safe, progressive rehab to promote return to normal use of her R/dominant UE.       Dasha Corral, PT, MSPT, OCS            Post Session Pain  Charge Capture  PT Visit Info MD Guidelines  MyChart

## 2023-11-28 ENCOUNTER — HOSPITAL ENCOUNTER (OUTPATIENT)
Dept: PHYSICAL THERAPY | Age: 63
Setting detail: RECURRING SERIES
Discharge: HOME | End: 2023-12-01
Attending: ORTHOPAEDIC SURGERY
Payer: COMMERCIAL

## 2023-11-28 PROCEDURE — 97110 THERAPEUTIC EXERCISES: CPT

## 2023-11-28 NOTE — PROGRESS NOTES
quadruped knee lift 3\"x5; and prone press up/cobra pose x5. Instruction and practice for HEP with good return demonstration and understanding. HEP: Verbal instruction for the updated exercises for HEP. She is to work on these and motion HEP. She is to continue to use ice as need. She verbalizes understanding. Treatment/Session Summary:    >Treatment Assessment:  She is 8.5 weeks post op. Pain continues to improve. No more than mild stiffness to the shoulder, most significantly to IR. Scapular and cuff strength are improving. Good understanding of the exercises for HEP practice. Communication/Consultation: None. Equipment provided today:  None  Recommendations/Intent for next treatment session: She will be out of town the next 3 weeks. She is to work on her HEP until next visit. She is to call or email me with questions or issues. Will review and progress her motion and strength HEP as appropriate.        >Total Treatment Billable Duration:  40 minutes   Time In: 1610  Time Out: [address], PT       Charge Capture  }Post Session Pain  PT Visit Info  [facility] Portal  MD Guidelines  Scanned Media  Benefits  MyChart    Future Appointments   Date Time Provider [address]   12/22/2023  1:45 PM [facility]   1/3/2024 10:30 AM Dex Bruner MD [facility]

## 2023-12-22 ENCOUNTER — HOSPITAL ENCOUNTER (OUTPATIENT)
Dept: PHYSICAL THERAPY | Age: 63
Setting detail: RECURRING SERIES
Discharge: HOME | End: 2023-12-25
Attending: ORTHOPAEDIC SURGERY
Payer: COMMERCIAL

## 2023-12-22 PROCEDURE — 97140 MANUAL THERAPY 1/> REGIONS: CPT

## 2023-12-22 PROCEDURE — 97110 THERAPEUTIC EXERCISES: CPT

## 2023-12-22 NOTE — PROGRESS NOTES
Berta David  : 1960  Primary: Lien Bañuelos (Melinda [facility])  Secondary:  [address] @ [facility]  [address] [address]  Phone: [phone]  Fax: [phone] Plan Frequency: 1x/week for first 5 weeks, then plan for 1-2x/week for the next 6 weeks. Plan of Care/Certification Expiration Date: 24      >PT Visit Info:  Plan Frequency: 1x/week for first 5 weeks, then plan for 1-2x/week for the next 6 weeks. Plan of Care/Certification Expiration Date: 24  Total # of Visits to Date: 5  Progress Note Counter: 5  Canceled Appointment: 1      Visit Count:  6    OUTPATIENT PHYSICAL THERAPY: Treatment Note 2023       Episode  }Appt Desk             Treatment Diagnosis:    Visit Diagnoses         Codes    Right shoulder pain, unspecified chronicity    -  Primary M25.511    Stiffness of right shoulder, not elsewhere classified     M25.611    Labral tear of shoulder, right, sequela     S43.431S        Medical/Referring Diagnosis: s/p Examination and manipulation of right shoulder, arthroscopy of right shoulder, arthroscopic subacromial decompression without acromioplasty, arthroscopic distal clavicle resection, lysis of adhesions, extensive debridement of SLAP tear, biceps labral complex, glenohumeral joint capsule, subacromial space, calcific deposits, mini-open biceps tenodesis. Calcific tendinitis of right shoulder [M75.31]  Bicipital tendinitis of right shoulder [M75.21]  Superior glenoid labrum lesion of right shoulder, initial encounter [F24.800X]  Adhesive capsulitis of right shoulder [M75.01]  Referring Physician:  Burgess Yoly MD MD Orders:  PT Evaluate & Treat- full motion and full strength ; 2x/week x5 weeks (23)  Return MD Appt: 1/3/24  Date of Onset:  Onset Date: 23     Allergies:   Patient has no known allergies. Restrictions/Precautions:  Restrictions/Precautions: Surgical protocol;  Other (comment) (ordered

## 2024-01-03 ENCOUNTER — OFFICE VISIT (OUTPATIENT)
Dept: ORTHOPEDIC SURGERY | Age: 64
End: 2024-01-03
Payer: COMMERCIAL

## 2024-01-03 DIAGNOSIS — Z47.89 ORTHOPEDIC AFTERCARE: Primary | ICD-10-CM

## 2024-01-03 PROCEDURE — 99212 OFFICE O/P EST SF 10 MIN: CPT | Performed by: ORTHOPAEDIC SURGERY

## 2024-01-03 NOTE — PROGRESS NOTES
Name: Yuki Bermeo  YOB: 1960  Gender: female  MRN: [id number]          HPI: Yuki Bermeo is a 63 y.o. right-hand-dominant female 3.5 months status post EUA and manipulation right shoulder, arthroscopy right shoulder ASD without acromioplasty, ADCR, lysis of adhesions, extensive debridement SLAP tear, biceps labral complex, glenohumeral joint capsule, subacromial space, calcific deposits, mini open biceps tenodesis.  She returns and is doing better.  She lacks a little bit of terminal motion particularly internal rotation    ROS/Meds/PSH/PMH/FH/SH: A ten system review of systems was performed and is negative other than what is in the HPI.   Tobacco:  reports that she has never smoked. She has never used smokeless tobacco.  There were no vitals taken for this visit.     Physical Examination:  She is an awake alert pleasant female ambulating without difficulty  She has a slight restriction in cervical spine range of motion without radicular findings    The left shoulder has 0 to 180 degrees of active and 0 to 180 degrees passive forward elevation.   Internal rotation is to T6.  External rotation is to 60 degrees at the side.   In the 90 degree abducted position 90 degrees of external and 90 degrees internal rotation  The AC joint is non-tender  SC joint is non-tender.   Greater tuberosity is non-tender.  negative biceps  Negative O'Briens sign  negative lift-off sign  Negative belly press sign  Negative bear huggers sign  negative drop sign  negative hornblower's sign  No posterior glenohumeral joint line tenderness.   No evident excessive external rotation  Rotator cuff strength is 5/5.  negative external rotation stress test.   Negative empty can sign  There is no evident anterior or posterior apprehension with a negative sulcus sign.   No instability  negative external and internal Rotation lag sign  Neurovascularly intact.      The right shoulder incisions have healed  The right

## 2024-01-05 ENCOUNTER — HOSPITAL ENCOUNTER (OUTPATIENT)
Dept: PHYSICAL THERAPY | Age: 64
Setting detail: RECURRING SERIES
Discharge: HOME | End: 2024-01-08
Attending: ORTHOPAEDIC SURGERY
Payer: COMMERCIAL

## 2024-01-05 PROCEDURE — 97110 THERAPEUTIC EXERCISES: CPT

## 2024-01-05 PROCEDURE — 97140 MANUAL THERAPY 1/> REGIONS: CPT

## 2024-01-05 NOTE — PROGRESS NOTES
Yuki Bermeo  : 1960  Primary: Abdulaziz Sc (Melinda ROSE)  Secondary:  [facility] [place] [address]  Phone: [phone]  Fax: [phone] Plan Frequency: 1x/week or every other week  Plan of Care/Certification Expiration Date: 24      >PT Visit Info:  Plan Frequency: 1x/week or every other week  Plan of Care/Certification Expiration Date: 24  Total # of Visits to Date: 7  Progress Note Counter: 5  Canceled Appointment: 1      Visit Count:  7    OUTPATIENT PHYSICAL THERAPY: Treatment Note 2024       Episode  }Appt Desk             Treatment Diagnosis:    Visit Diagnoses         Codes    Right shoulder pain, unspecified chronicity    -  Primary M25.511    Stiffness of right shoulder, not elsewhere classified     M25.611    Labral tear of shoulder, right, sequela     S43.431S        Medical/Referring Diagnosis: s/p Examination and manipulation of right shoulder, arthroscopy of right shoulder, arthroscopic subacromial decompression without acromioplasty, arthroscopic distal clavicle resection, lysis of adhesions, extensive debridement of SLAP tear, biceps labral complex, glenohumeral joint capsule, subacromial space, calcific deposits, mini-open biceps tenodesis.   Calcific tendinitis of right shoulder [M75.31]  Bicipital tendinitis of right shoulder [M75.21]  Superior glenoid labrum lesion of right shoulder, initial encounter [S43.431A]  Adhesive capsulitis of right shoulder [M75.01]  Referring Physician:  Dennis Bruner Jr., MD MD Orders:  PT Evaluate & Treat- full motion and full strength ; 2x/week x5 weeks (23)  Return MD Appt: 1/3/24  Date of Onset:  Onset Date: 23     Allergies:   Patient has no known allergies.  Restrictions/Precautions:  Restrictions/Precautions: Surgical protocol; Other (comment) (ordered restrictions)  No data recorded     Interventions Planned (Treatment may consist of any combination of the

## 2024-01-11 ENCOUNTER — HOSPITAL ENCOUNTER (OUTPATIENT)
Dept: PHYSICAL THERAPY | Age: 64
Setting detail: RECURRING SERIES
Discharge: HOME | End: 2024-01-14
Attending: ORTHOPAEDIC SURGERY
Payer: COMMERCIAL

## 2024-01-11 PROCEDURE — 97110 THERAPEUTIC EXERCISES: CPT

## 2024-01-11 NOTE — PROGRESS NOTES
to be progressing.    Communication/Consultation: none  Equipment provided today:  None  Recommendations/Intent for next treatment session: Continue with shoulder strengthening progression, shoulder ROM, and review and update HEP as needed.     >Total Treatment Billable Duration:  40 minutes   Time In: 1000  Time Out: 1040    Raghu Cabezas, PT       Charge Capture  }Post Session Pain  PT Visit Info  [employer] Portal  MD Guidelines  Scanned Media  Benefits  MyChart    Future Appointments   Date Time Provider Department Center   1/17/2024  9:00 AM Raghu Cabezas, PT [facility]   1/24/2024  9:00 AM Raghu Cabezas, PT [facility]   1/31/2024  9:00 AM Raghu Cabezas, PT [facility]   3/11/2024 12:45 PM Dennis Bruner Jr., MD [facility]

## 2024-01-17 ENCOUNTER — HOSPITAL ENCOUNTER (OUTPATIENT)
Dept: PHYSICAL THERAPY | Age: 64
Setting detail: RECURRING SERIES
Discharge: HOME | End: 2024-01-20
Attending: ORTHOPAEDIC SURGERY
Payer: COMMERCIAL

## 2024-01-17 PROCEDURE — 97110 THERAPEUTIC EXERCISES: CPT

## 2024-01-17 ASSESSMENT — PAIN SCALES - GENERAL: PAINLEVEL_OUTOF10: 4

## 2024-01-17 NOTE — PROGRESS NOTES
Yuki Bermeo  : 1960  Primary: Abdulaziz Sc (Melinda ROSE)  Secondary:  [facility] [place] [address]  Phone: [phone]  Fax: [phone] Plan Frequency: 1x/week or every other week  Plan of Care/Certification Expiration Date: 24      >PT Visit Info:  Plan Frequency: 1x/week or every other week  Plan of Care/Certification Expiration Date: 24  Total # of Visits to Date: 9  Progress Note Counter: 2  Canceled Appointment: 1      Visit Count:  9    OUTPATIENT PHYSICAL THERAPY: Treatment Note 2024       Episode  }Appt Desk             Treatment Diagnosis:    Visit Diagnoses         Codes    Right shoulder pain, unspecified chronicity    -  Primary M25.511    Stiffness of right shoulder, not elsewhere classified     M25.611    Labral tear of shoulder, right, sequela     S43.431S        Medical/Referring Diagnosis: s/p Examination and manipulation of right shoulder, arthroscopy of right shoulder, arthroscopic subacromial decompression without acromioplasty, arthroscopic distal clavicle resection, lysis of adhesions, extensive debridement of SLAP tear, biceps labral complex, glenohumeral joint capsule, subacromial space, calcific deposits, mini-open biceps tenodesis.   Calcific tendinitis of right shoulder [M75.31]  Bicipital tendinitis of right shoulder [M75.21]  Superior glenoid labrum lesion of right shoulder, initial encounter [S43.431A]  Adhesive capsulitis of right shoulder [M75.01]  Referring Physician:  Dennis Bruner Jr., MD MD Orders:  PT Evaluate & Treat- full motion and full strength ; 2x/week x5 weeks (23)  Return MD Appt: 1/3/24  Date of Onset:  Onset Date: 23     Allergies:   Patient has no known allergies.  Restrictions/Precautions:  Restrictions/Precautions: Surgical protocol; Other (comment) (ordered restrictions)  No data recorded     Interventions Planned (Treatment may consist of any combination of the

## 2024-01-24 ENCOUNTER — HOSPITAL ENCOUNTER (OUTPATIENT)
Dept: PHYSICAL THERAPY | Age: 64
Setting detail: RECURRING SERIES
Discharge: HOME | End: 2024-01-27
Attending: ORTHOPAEDIC SURGERY
Payer: COMMERCIAL

## 2024-01-24 PROCEDURE — 97110 THERAPEUTIC EXERCISES: CPT

## 2024-01-24 NOTE — PROGRESS NOTES
Yuki Bermeo  : 1960  Primary: Abdulaziz Sc (Melinda ROSE)  Secondary:  [facility] [place] [address]  Phone: [phone]  Fax: [phone] Plan Frequency: 1x/week or every other week  Plan of Care/Certification Expiration Date: 24      >PT Visit Info:  Plan Frequency: 1x/week or every other week  Plan of Care/Certification Expiration Date: 24  Total # of Visits to Date: 10  Progress Note Counter: 3  Canceled Appointment: 1      Visit Count:  10    OUTPATIENT PHYSICAL THERAPY: Treatment Note 2024       Episode  }Appt Desk             Treatment Diagnosis:    Visit Diagnoses         Codes    Right shoulder pain, unspecified chronicity    -  Primary M25.511    Stiffness of right shoulder, not elsewhere classified     M25.611    Labral tear of shoulder, right, sequela     S43.431S        Medical/Referring Diagnosis: s/p Examination and manipulation of right shoulder, arthroscopy of right shoulder, arthroscopic subacromial decompression without acromioplasty, arthroscopic distal clavicle resection, lysis of adhesions, extensive debridement of SLAP tear, biceps labral complex, glenohumeral joint capsule, subacromial space, calcific deposits, mini-open biceps tenodesis.   Calcific tendinitis of right shoulder [M75.31]  Bicipital tendinitis of right shoulder [M75.21]  Superior glenoid labrum lesion of right shoulder, initial encounter [S43.431A]  Adhesive capsulitis of right shoulder [M75.01]  Referring Physician:  Dennis Bruner Jr., MD MD Orders:  PT Evaluate & Treat- full motion and full strength ; 2x/week x5 weeks (23)  Return MD Appt: 1/3/24  Date of Onset:  Onset Date: 23     Allergies:   Patient has no known allergies.  Restrictions/Precautions:  Restrictions/Precautions: Surgical protocol; Other (comment) (ordered restrictions)  No data recorded     Interventions Planned (Treatment may consist of any combination of

## 2024-01-31 ENCOUNTER — HOSPITAL ENCOUNTER (OUTPATIENT)
Dept: PHYSICAL THERAPY | Age: 64
Setting detail: RECURRING SERIES
Discharge: HOME | End: 2024-02-03
Attending: ORTHOPAEDIC SURGERY
Payer: COMMERCIAL

## 2024-01-31 PROCEDURE — 97110 THERAPEUTIC EXERCISES: CPT

## 2024-01-31 NOTE — PROGRESS NOTES
with her HEP.   Communication/Consultation: none  Equipment provided today:  None  Recommendations/Intent for next treatment session: She is to work on her HEP while out of town. She is to follow up with me on her return for check and consider discharge.     >Total Treatment Billable Duration:  30 minutes   Time In: 0915  Time Out: 0950    Raghu Cabezas, PT       Charge Capture  }Post Session Pain  PT Visit Info  [employer] Portal  MD Guidelines  Scanned Media  Benefits  MyChart    Future Appointments   Date Time Provider Department Center   3/6/2024  9:45 AM Raghu Cabezas, PT [facility]   3/11/2024 12:45 PM Franc, Dennis JUAREZ Jr., MD [facility]

## 2024-03-06 ENCOUNTER — HOSPITAL ENCOUNTER (OUTPATIENT)
Dept: PHYSICAL THERAPY | Age: 64
Setting detail: RECURRING SERIES
End: 2024-03-06
Attending: ORTHOPAEDIC SURGERY
Payer: COMMERCIAL

## 2024-03-06 NOTE — PROGRESS NOTES
Yuki A Tarik Looney  : 1960  Primary: Milton Sc ([place])  Secondary:  [facility]   [place] [address]  Phone: [phone]  Fax: [phone] Plan Frequency: 1x/week or every other week  Plan of Care/Certification Expiration Date: 24      >PT Visit Info:  Plan Frequency: 1x/week or every other week  Plan of Care/Certification Expiration Date: 24  Total # of Visits to Date: 11  Progress Note Counter: 4  Canceled Appointment: 2       OUTPATIENT PHYSICAL THERAPY: Cancellation Note 3/6/2024       Episode  }Appt Desk             Treatment Diagnosis:    Visit Diagnoses         Codes    Right shoulder pain, unspecified chronicity    -  Primary M25.511    Stiffness of right shoulder, not elsewhere classified     M25.611    Labral tear of shoulder, right, sequela     S43.431S        Medical/Referring Diagnosis: s/p Examination and manipulation of right shoulder, arthroscopy of right shoulder, arthroscopic subacromial decompression without acromioplasty, arthroscopic distal clavicle resection, lysis of adhesions, extensive debridement of SLAP tear, biceps labral complex, glenohumeral joint capsule, subacromial space, calcific deposits, mini-open biceps tenodesis.   Calcific tendinitis of right shoulder [M75.31]  Bicipital tendinitis of right shoulder [M75.21]  Superior glenoid labrum lesion of right shoulder, initial encounter [S43.431A]  Adhesive capsulitis of right shoulder [M75.01]  Referring Physician:  Dennis Bruner Jr., MD MD Orders:  PT Evaluate & Treat- full motion and full strength ; 2x/week x5 weeks (23)  Return MD Appt: 1/3/24  Date of Onset:  Onset Date: 23       Yuki BYNUM Tarik Looney called and cancelled her appointment this morning.        Raghu Cabezas, PT       Charge Capture  }Post Session Pain  PT Visit Info  [employer] Portal  MD Guidelines  Scanned Media  Benefits  Signadynehart    Future Appointments   Date Time

## 2024-03-15 ENCOUNTER — HOSPITAL ENCOUNTER (OUTPATIENT)
Dept: PHYSICAL THERAPY | Age: 64
Setting detail: RECURRING SERIES
Discharge: HOME | End: 2024-03-18
Attending: ORTHOPAEDIC SURGERY
Payer: COMMERCIAL

## 2024-03-15 PROCEDURE — 97110 THERAPEUTIC EXERCISES: CPT

## 2024-03-15 NOTE — THERAPY RECERTIFICATION
Yuki Bermeo  : 1960  Primary: Abdulaziz Sc (Melinda ROSE)  Secondary:  [facility] [place] [address]  Phone: [phone]  Fax: [phone] Plan Frequency: 1x/week or every other week  Plan of Care/Certification Expiration Date: 24      PT Visit Info:  Plan Frequency: 1x/week or every other week  Plan of Care/Certification Expiration Date: 24  Total # of Visits to Date: 12  Progress Note Counter: 5  Canceled Appointment: 2      Visit Count:  12                OUTPATIENT PHYSICAL THERAPY:             Recertification 3/15/2024               Episode (s/p R Shoulder Scope) Appt Desk         Treatment Diagnosis:    Visit Diagnoses           Codes     Right shoulder pain, unspecified chronicity    -  Primary M25.511     Stiffness of right shoulder, not elsewhere classified     M25.611     Labral tear of shoulder, right, sequela     S43.431S          Medical/Referring Diagnosis:   s/p Examination and manipulation of right shoulder, arthroscopy of right shoulder, arthroscopic subacromial decompression without acromioplasty, arthroscopic distal clavicle resection, lysis of adhesions, extensive debridement of SLAP tear, biceps labral complex, glenohumeral joint capsule, subacromial space, calcific deposits, mini-open biceps tenodesis.   Calcific tendinitis of right shoulder [M75.31]  Bicipital tendinitis of right shoulder [M75.21]  Superior glenoid labrum lesion of right shoulder, initial encounter [S43.431A]  Adhesive capsulitis of right shoulder [M75.01]  Referring Physician:  Dennis Bruner Jr., MD MD Orders:   PT eval & treat, home exercise program, and full motion, full strength; 2x/week x6 weeks (1/3/24)   Return MD Appt:   Date of Onset:  Onset Date: 23          OBJECTIVE   ROM:  PROM RUE (degrees)  R Shoulder Flex  (0-180): 160 (forward elevation)  R Shoulder ABduction (0-180): 95 (with scapula stabilized)  R Shoulder Int

## 2024-03-15 NOTE — PROGRESS NOTES
Yuki Bermeo  : 1960  Primary: Abdulaziz Sc (Melinda ROSE)  Secondary:  [facility] [place] [address]  Phone: [phone]  Fax: [phone] Plan Frequency: 1x/week or every other week  Plan of Care/Certification Expiration Date: 24      >PT Visit Info:  Plan Frequency: 1x/week or every other week  Plan of Care/Certification Expiration Date: 24  Total # of Visits to Date: 12  Progress Note Counter: 5  Canceled Appointment: 2      Visit Count:  12    OUTPATIENT PHYSICAL THERAPY: Treatment Note 3/15/2024       Episode  }Appt Desk             Treatment Diagnosis:    Visit Diagnoses         Codes    Right shoulder pain, unspecified chronicity    -  Primary M25.511    Stiffness of right shoulder, not elsewhere classified     M25.611    Labral tear of shoulder, right, sequela     S43.431S        Medical/Referring Diagnosis: s/p Examination and manipulation of right shoulder, arthroscopy of right shoulder, arthroscopic subacromial decompression without acromioplasty, arthroscopic distal clavicle resection, lysis of adhesions, extensive debridement of SLAP tear, biceps labral complex, glenohumeral joint capsule, subacromial space, calcific deposits, mini-open biceps tenodesis.   Calcific tendinitis of right shoulder [M75.31]  Bicipital tendinitis of right shoulder [M75.21]  Superior glenoid labrum lesion of right shoulder, initial encounter [S43.431A]  Adhesive capsulitis of right shoulder [M75.01]  Referring Physician:  Dennis Bruner Jr., MD MD Orders:  PT eval & treat, home exercise program, and full motion, full strength; 2x/week x6 weeks (1/3/24)  Return MD Appt:  Date of Onset:  Onset Date: 23     Allergies:   Patient has no known allergies.  Restrictions/Precautions:  Restrictions/Precautions: Surgical protocol; Other (comment) (ordered restrictions)  No data recorded     Interventions Planned (Treatment may consist of any

## 2024-03-21 ENCOUNTER — HOSPITAL ENCOUNTER (OUTPATIENT)
Dept: PHYSICAL THERAPY | Age: 64
Setting detail: RECURRING SERIES
End: 2024-03-21
Attending: ORTHOPAEDIC SURGERY
Payer: COMMERCIAL

## 2024-03-21 NOTE — PROGRESS NOTES
Yuki A Tarik Looney  : 1960  Primary: Abdulaziz Sc (Melinda ROSE)  Secondary:  [facility] [place] [address]  Phone: [phone]  Fax: [phone] Plan Frequency: 1x/week or every other week  Plan of Care/Certification Expiration Date: 24      >PT Visit Info:  Plan Frequency: 1x/week or every other week  Plan of Care/Certification Expiration Date: 24  Total # of Visits to Date: 12  Progress Note Counter: 5  Canceled Appointment: 2       OUTPATIENT PHYSICAL THERAPY: Cancellation Note 3/21/2024       Episode  }Appt Desk             Treatment Diagnosis:    Visit Diagnoses         Codes    Right shoulder pain, unspecified chronicity    -  Primary M25.511    Stiffness of right shoulder, not elsewhere classified     M25.611    Labral tear of shoulder, right, sequela     S43.431S        Medical/Referring Diagnosis: s/p Examination and manipulation of right shoulder, arthroscopy of right shoulder, arthroscopic subacromial decompression without acromioplasty, arthroscopic distal clavicle resection, lysis of adhesions, extensive debridement of SLAP tear, biceps labral complex, glenohumeral joint capsule, subacromial space, calcific deposits, mini-open biceps tenodesis.   Calcific tendinitis of right shoulder [M75.31]  Bicipital tendinitis of right shoulder [M75.21]  Superior glenoid labrum lesion of right shoulder, initial encounter [S43.431A]  Adhesive capsulitis of right shoulder [M75.01]  Referring Physician:  Dennis Bruner Jr., MD MD Orders:  PT Evaluate & Treat- full motion and full strength ; 2x/week x5 weeks (23)  Return MD Appt: 1/3/24  Date of Onset:  Onset Date: 23       Yuki FLETCHER Bermeo called and cancelled her appointment this morning.        Cuca Velez PTA       Charge Capture  }Post Session Pain  PT Visit Info  [employer] Portal  MD Guidelines  Scanned Media  Benefits  MyChart    Future Appointments   Date Time Provider

## 2024-03-26 ENCOUNTER — HOSPITAL ENCOUNTER (OUTPATIENT)
Dept: PHYSICAL THERAPY | Age: 64
Setting detail: RECURRING SERIES
Discharge: HOME | End: 2024-03-29
Attending: ORTHOPAEDIC SURGERY
Payer: COMMERCIAL

## 2024-03-26 PROCEDURE — 97110 THERAPEUTIC EXERCISES: CPT

## 2024-03-26 NOTE — PROGRESS NOTES
Yuki Bermeo  : 1960  Primary: Abdulaziz Sc (Melinda ROSE)  Secondary:  [facility] [place] [address]  Phone: [phone]  Fax: [phone] Plan Frequency: 1x/week or every other week  Plan of Care/Certification Expiration Date: 24      >PT Visit Info:  Plan Frequency: 1x/week or every other week  Plan of Care/Certification Expiration Date: 24  Total # of Visits to Date: 13  Progress Note Counter: 1  Canceled Appointment: 2      Visit Count:  13    OUTPATIENT PHYSICAL THERAPY: Treatment Note 3/26/2024       Episode  }Appt Desk             Treatment Diagnosis:    Visit Diagnoses         Codes    Right shoulder pain, unspecified chronicity    -  Primary M25.511    Stiffness of right shoulder, not elsewhere classified     M25.611    Labral tear of shoulder, right, sequela     S43.431S        Medical/Referring Diagnosis: s/p Examination and manipulation of right shoulder, arthroscopy of right shoulder, arthroscopic subacromial decompression without acromioplasty, arthroscopic distal clavicle resection, lysis of adhesions, extensive debridement of SLAP tear, biceps labral complex, glenohumeral joint capsule, subacromial space, calcific deposits, mini-open biceps tenodesis.   Calcific tendinitis of right shoulder [M75.31]  Bicipital tendinitis of right shoulder [M75.21]  Superior glenoid labrum lesion of right shoulder, initial encounter [S43.431A]  Adhesive capsulitis of right shoulder [M75.01]  Referring Physician:  Dennis Bruner Jr., MD MD Orders:  PT eval & treat, home exercise program, and full motion, full strength; 2x/week x6 weeks (1/3/24)  Return MD Appt:  Date of Onset:  Onset Date: 23     Allergies:   Patient has no known allergies.  Restrictions/Precautions:  Restrictions/Precautions: Surgical protocol; Other (comment) (ordered restrictions)  No data recorded     Interventions Planned (Treatment may consist of any

## 2024-04-03 ENCOUNTER — APPOINTMENT (OUTPATIENT)
Dept: PHYSICAL THERAPY | Age: 64
End: 2024-04-03
Attending: ORTHOPAEDIC SURGERY
Payer: COMMERCIAL

## 2024-04-10 ENCOUNTER — APPOINTMENT (OUTPATIENT)
Dept: PHYSICAL THERAPY | Age: 64
End: 2024-04-10
Attending: ORTHOPAEDIC SURGERY
Payer: COMMERCIAL

## 2024-04-10 ENCOUNTER — HOSPITAL ENCOUNTER (OUTPATIENT)
Dept: PHYSICAL THERAPY | Age: 64
Setting detail: RECURRING SERIES
End: 2024-04-10
Attending: ORTHOPAEDIC SURGERY
Payer: COMMERCIAL

## 2024-04-10 NOTE — PROGRESS NOTES
Yuki Bermeo  : 1960  Primary: Milton Sc ([place])  Secondary:  [facility]   [place] [address]  Phone: [phone]  Fax: [phone] Plan Frequency: 1x/week or every other week  Plan of Care/Certification Expiration Date: 24      >PT Visit Info:  Plan Frequency: 1x/week or every other week  Plan of Care/Certification Expiration Date: 24  Total # of Visits to Date: 13  Progress Note Counter: 1  Canceled Appointment: 2       OUTPATIENT PHYSICAL THERAPY: Cancellation Note 4/10/2024       Episode  }Appt Desk             Treatment Diagnosis:    Visit Diagnoses         Codes    Right shoulder pain, unspecified chronicity    -  Primary M25.511    Stiffness of right shoulder, not elsewhere classified     M25.611    Labral tear of shoulder, right, sequela     S43.431S        Medical/Referring Diagnosis: s/p Examination and manipulation of right shoulder, arthroscopy of right shoulder, arthroscopic subacromial decompression without acromioplasty, arthroscopic distal clavicle resection, lysis of adhesions, extensive debridement of SLAP tear, biceps labral complex, glenohumeral joint capsule, subacromial space, calcific deposits, mini-open biceps tenodesis.   Calcific tendinitis of right shoulder [M75.31]  Bicipital tendinitis of right shoulder [M75.21]  Superior glenoid labrum lesion of right shoulder, initial encounter [S43.431A]  Adhesive capsulitis of right shoulder [M75.01]  Referring Physician:  Dennis Bruner Jr., MD MD Orders:  PT eval & treat, home exercise program, and full motion, full strength; 2x/week x6 weeks (1/3/24)  Return MD Appt:  Date of Onset:  Onset Date: 23       Yuki Bermeo cancelled this appointment prior to today due to a conflict.        Raghu Cabezas, PT       Charge Capture  }Post Session Pain  PT Visit Info  [employer] Portal  MD Guidelines  Scanned Media  Benefits  MyChart    Future

## 2024-04-17 ENCOUNTER — HOSPITAL ENCOUNTER (OUTPATIENT)
Dept: PHYSICAL THERAPY | Age: 64
Setting detail: RECURRING SERIES
End: 2024-04-17
Attending: ORTHOPAEDIC SURGERY
Payer: COMMERCIAL

## 2024-04-17 NOTE — PROGRESS NOTES
Yuki Bermeo  : 1960  Primary: Milton Sc ([place] MIL[place])  Secondary:  [facility]   [place] [address]  Phone: [phone]  Fax: [phone] Plan Frequency: 1x/week or every other week  Plan of Care/Certification Expiration Date: 24      >PT Visit Info:  Plan Frequency: 1x/week or every other week  Plan of Care/Certification Expiration Date: 24  Total # of Visits to Date: 13  Progress Note Counter: 1  Canceled Appointment: 2       OUTPATIENT PHYSICAL THERAPY: Cancellation Note 2024       Episode  }Appt Desk             Treatment Diagnosis:    Visit Diagnoses         Codes    Right shoulder pain, unspecified chronicity    -  Primary M25.511    Stiffness of right shoulder, not elsewhere classified     M25.611    Labral tear of shoulder, right, sequela     S43.431S        Medical/Referring Diagnosis: s/p Examination and manipulation of right shoulder, arthroscopy of right shoulder, arthroscopic subacromial decompression without acromioplasty, arthroscopic distal clavicle resection, lysis of adhesions, extensive debridement of SLAP tear, biceps labral complex, glenohumeral joint capsule, subacromial space, calcific deposits, mini-open biceps tenodesis.   Calcific tendinitis of right shoulder [M75.31]  Bicipital tendinitis of right shoulder [M75.21]  Superior glenoid labrum lesion of right shoulder, initial encounter [S43.431A]  Adhesive capsulitis of right shoulder [M75.01]  Referring Physician:  Dennis Bruner Jr., MD MD Orders:  PT eval & treat, home exercise program, and full motion, full strength; 2x/week x6 weeks (1/3/24)  Return MD Appt:  Date of Onset:  Onset Date: 23       Yuki Bermeo cancelled this appointment due to a schedule conflict. She re-scheduled for tomorrow.       Raghu Cabezas, PT       Charge Capture  }Post Session Pain  PT Visit Info  [employer] Portal  MD Guidelines  Scanned Media  Benefits

## 2024-04-18 ENCOUNTER — HOSPITAL ENCOUNTER (OUTPATIENT)
Dept: PHYSICAL THERAPY | Age: 64
Setting detail: RECURRING SERIES
Discharge: HOME | End: 2024-04-21
Attending: ORTHOPAEDIC SURGERY
Payer: COMMERCIAL

## 2024-04-18 PROCEDURE — 97110 THERAPEUTIC EXERCISES: CPT

## 2024-04-18 NOTE — PROGRESS NOTES
combination of the following):    Current Treatment Recommendations: ROM; Strengthening; Home exercise program; Manual; Modalities (dicated by MD orders and post op protocol.)     >Subjective Comments: Says her shoulder has had increased soreness to the R shoulder. Pain noted to superior and posterior shoulder with active use. Has been doing yoga routine daily which includes UE loaded poses. Pain is primarily with active use of the shoulder, and improves with rest.     >Initial:      No pain now.  >Post Session:      no VAS    Medications Last Reviewed:  4/18/2024    Updated Objective Findings:    Palpation: tenderness to R subscapularis, upper trap, mid deltoid, mildly to infraspinatus.     Treatment   THERAPEUTIC EXERCISE: (40 minutes):  Positional Release to R subscapularis, mid trap, infraspinatus for muscle dysfunctions. Exercises for shoulder motion with Assisted Active Isolated Stretches in supine to ER at 30, 45, and 80 degrees abduction, IR stabilized at 45 and 90 degrees abduction, ER and IR at 90 degrees flexion, abduction, forward elevation, horizontal abduction/adduction, D1 and D2 flexion--3\"x10 each.   Exercises for muscle activation with prone middle trap, lower trap, and prone ER and IR with manual guiding and resistance, 1x10 each. Reviewed HEP.    HEP:  Advised to hold on the UE loading yoga exercises to allow recovery. She is to respect pain with exercise and ADLs. She is to allow for tissue recover  She verbalizes understanding.  [employer] Portal  1/24/24: access code [email or web address]    Treatment/Session Summary:    >Treatment Assessment: Increased R shoulder soreness and pain with her normal daily activities and with her yoga routine. Advised to hold on the exercises through the weekend at least. She can use ice as needed. She has good shoulder ROM with nom more than mild stiffness rotation. Good performance of the exercises done today. Good understanding for HEP practice. Will review these.

## 2024-04-24 ENCOUNTER — HOSPITAL ENCOUNTER (OUTPATIENT)
Dept: PHYSICAL THERAPY | Age: 64
Setting detail: RECURRING SERIES
Discharge: HOME | End: 2024-04-27
Attending: ORTHOPAEDIC SURGERY
Payer: COMMERCIAL

## 2024-04-24 PROCEDURE — 97140 MANUAL THERAPY 1/> REGIONS: CPT

## 2024-04-24 PROCEDURE — 97110 THERAPEUTIC EXERCISES: CPT

## 2024-04-24 NOTE — PROGRESS NOTES
Yuki Bermeo  : 1960  Primary: Abdulaziz Sc (Melinda ROSE)  Secondary:  [facility] [place] [address]  Phone: [phone]  Fax: [phone] Plan Frequency: 1x/week or every other week  Plan of Care/Certification Expiration Date: 24      >PT Visit Info:  Plan Frequency: 1x/week or every other week  Plan of Care/Certification Expiration Date: 24  Total # of Visits to Date: 15  Progress Note Counter: 3  Canceled Appointment: 2      Visit Count:  15    OUTPATIENT PHYSICAL THERAPY: Treatment Note 2024       Episode  }Appt Desk             Treatment Diagnosis:    Visit Diagnoses         Codes    Right shoulder pain, unspecified chronicity    -  Primary M25.511    Stiffness of right shoulder, not elsewhere classified     M25.611    Labral tear of shoulder, right, sequela     S43.431S        Medical/Referring Diagnosis: s/p Examination and manipulation of right shoulder, arthroscopy of right shoulder, arthroscopic subacromial decompression without acromioplasty, arthroscopic distal clavicle resection, lysis of adhesions, extensive debridement of SLAP tear, biceps labral complex, glenohumeral joint capsule, subacromial space, calcific deposits, mini-open biceps tenodesis.   Calcific tendinitis of right shoulder [M75.31]  Bicipital tendinitis of right shoulder [M75.21]  Superior glenoid labrum lesion of right shoulder, initial encounter [S43.431A]  Adhesive capsulitis of right shoulder [M75.01]  Referring Physician:  Dennis Bruner Jr., MD MD Orders:  PT eval & treat, home exercise program, and full motion, full strength; 2x/week x6 weeks (1/3/24)  Return MD Appt:  Date of Onset:  Onset Date: 23     Allergies:   Patient has no known allergies.  Restrictions/Precautions:  Restrictions/Precautions: Surgical protocol; Other (comment) (ordered restrictions)  No data recorded     Interventions Planned (Treatment may consist of any

## 2024-06-27 ENCOUNTER — HOSPITAL ENCOUNTER (EMERGENCY)
Age: 64
Discharge: HOME | End: 2024-06-27
Payer: COMMERCIAL

## 2024-06-27 ENCOUNTER — APPOINTMENT (OUTPATIENT)
Dept: ULTRASOUND IMAGING | Age: 64
End: 2024-06-27
Payer: COMMERCIAL

## 2024-06-27 ENCOUNTER — APPOINTMENT (OUTPATIENT)
Dept: GENERAL RADIOLOGY | Age: 64
End: 2024-06-27
Payer: COMMERCIAL

## 2024-06-27 VITALS
HEIGHT: 63 IN | WEIGHT: 114 LBS | HEART RATE: 63 BPM | SYSTOLIC BLOOD PRESSURE: 93 MMHG | DIASTOLIC BLOOD PRESSURE: 58 MMHG | TEMPERATURE: 98.1 F | RESPIRATION RATE: 16 BRPM | OXYGEN SATURATION: 99 % | BODY MASS INDEX: 20.2 KG/M2

## 2024-06-27 DIAGNOSIS — M54.31 SCIATICA OF RIGHT SIDE: Primary | ICD-10-CM

## 2024-06-27 DIAGNOSIS — M51.37 DDD (DEGENERATIVE DISC DISEASE), LUMBOSACRAL: ICD-10-CM

## 2024-06-27 LAB
APPEARANCE UR: CLEAR
BACTERIA URNS QL MICRO: NEGATIVE /HPF
BILIRUB UR QL: NEGATIVE
COLOR UR: ABNORMAL
EPI CELLS #/AREA URNS HPF: ABNORMAL /HPF
GLUCOSE UR STRIP.AUTO-MCNC: NEGATIVE MG/DL
HGB UR QL STRIP: NEGATIVE
HYALINE CASTS URNS QL MICRO: ABNORMAL /LPF
KETONES UR QL STRIP.AUTO: NEGATIVE MG/DL
LEUKOCYTE ESTERASE UR QL STRIP.AUTO: ABNORMAL
NITRITE UR QL STRIP.AUTO: NEGATIVE
PH UR STRIP: 6 (ref 5–9)
PROT UR STRIP-MCNC: NEGATIVE MG/DL
RBC #/AREA URNS HPF: ABNORMAL /HPF
SP GR UR REFRACTOMETRY: 1.01 (ref 1–1.02)
UROBILINOGEN UR QL STRIP.AUTO: 0.2 EU/DL (ref 0.2–1)
WBC URNS QL MICRO: ABNORMAL /HPF

## 2024-06-27 PROCEDURE — 72100 X-RAY EXAM L-S SPINE 2/3 VWS: CPT

## 2024-06-27 PROCEDURE — 73502 X-RAY EXAM HIP UNI 2-3 VIEWS: CPT

## 2024-06-27 PROCEDURE — 93971 EXTREMITY STUDY: CPT

## 2024-06-27 PROCEDURE — 99284 EMERGENCY DEPT VISIT MOD MDM: CPT

## 2024-06-27 PROCEDURE — 81001 URINALYSIS AUTO W/SCOPE: CPT

## 2024-06-27 RX ORDER — METHOCARBAMOL 500 MG/1
500 TABLET, FILM COATED ORAL 3 TIMES DAILY PRN
Qty: 30 TABLET | Refills: 0 | Status: SHIPPED | OUTPATIENT
Start: 2024-06-27 | End: 2024-07-07

## 2024-06-27 RX ORDER — METHYLPREDNISOLONE 4 MG/1
TABLET ORAL
Qty: 1 KIT | Refills: 0 | Status: SHIPPED | OUTPATIENT
Start: 2024-06-27 | End: 2024-07-03

## 2024-06-27 RX ORDER — LIDOCAINE 50 MG/G
1 PATCH TOPICAL DAILY
Qty: 30 PATCH | Refills: 0 | Status: SHIPPED | OUTPATIENT
Start: 2024-06-27 | End: 2024-07-27

## 2024-06-27 ASSESSMENT — PAIN - FUNCTIONAL ASSESSMENT
PAIN_FUNCTIONAL_ASSESSMENT: 0-10
PAIN_FUNCTIONAL_ASSESSMENT: 0-10

## 2024-06-27 ASSESSMENT — PAIN SCALES - GENERAL
PAINLEVEL_OUTOF10: 7
PAINLEVEL_OUTOF10: 5
PAINLEVEL_OUTOF10: 7

## 2024-06-27 NOTE — ED TRIAGE NOTES
Patient presents complaining of right posterior leg pain x 1 week. Patient is concerned she has DVT. Endorses use of hormones. No trauma/injury.

## 2024-06-27 NOTE — DISCHARGE INSTRUCTIONS
Use warm, moist heat to area, massage, gentle range of motion and stretching to area. Use the medication as directed, ED if worse. I will refer to a Ortho for follow up. Also, follow up with PCP for recheck.

## 2024-06-27 NOTE — ED PROVIDER NOTES
Emergency Department Provider Note       PCP: Deepak Osorio MD   Age: 63 y.o.   Sex: female     DISPOSITION Decision To Discharge 06/27/2024 07:33:28 PM       ICD-10-CM    1. Sciatica of right side  M54.31 [facility]      2. DDD (degenerative disc disease), lumbosacral  M51.37 [facility]          Medical Decision Making     Urine did have trace leukocytes, therefore it was sent for culture.  She is asymptomatic.  If it does show she needs antibiotics, we will need to call her.  The venous Doppler is negative for blood clot.  Patient's exam and x-rays are reassuring.  There is no emergent indication at this time for further evaluation at this time in the emergency department.  She is neurologically intact.  Her exam was reassuring.  She was advised to follow-up with her primary care physician for recheck, and Ortho for follow-up and symptomatic treatment with muscle relaxer, anti-inflammatory and lidocaine patches were prescribed.  She was instructed on warm moist heat, massage and stretching multiple times a day.  Return immediately if worsening in any way.  We discussed signs and symptoms that would need emergent evaluation and she expressed understanding.  Patient is stable for discharge and ambulatory out of the ED without difficulty at this time.  Of note patient's initial vital signs did show some hypotension and tachycardia however she is very thin and a  and vital signs normalized during her visit here.  She was anxious and in pain.  No signs of sepsis at this time.       1 or more acute illnesses that pose a threat to life or bodily function.   Over the counter drug management performed.  Prescription drug management performed.  Shared medical decision making was utilized in creating the patients health plan today.  Considerations: The following items were considered but not ordered: Further evaluation.    I independently ordered

## (undated) DEVICE — SHOULDER ARTHRO DR POSTA: Brand: MEDLINE INDUSTRIES, INC.

## (undated) DEVICE — SUTURE VCRL SZ 0 L27IN ABSRB UD L36MM CP-1 1/2 CIR REV CUT J267H

## (undated) DEVICE — SLING & SWATHE: Brand: DEROYAL

## (undated) DEVICE — SUTURE N ABSRB BRAIDED 2-0 MO-6 39 IN 26 MM 1/2 CIR BLU BLK 3910900023

## (undated) DEVICE — GAUZE,SPONGE,4"X4",16PLY,STRL,LF,10/TRAY: Brand: MEDLINE

## (undated) DEVICE — [RESECTOR CUTTER, ARTHROSCOPIC SHAVER BLADE,  DO NOT RESTERILIZE,  DO NOT USE IF PACKAGE IS DAMAGED,  KEEP DRY,  KEEP AWAY FROM SUNLIGHT]: Brand: FORMULA

## (undated) DEVICE — [AGGRESSIVE 6-FLUTE BARREL BUR, ARTHROSCOPIC SHAVER BLADE,  DO NOT RESTERILIZE,  DO NOT USE IF PACKAGE IS DAMAGED,  KEEP DRY,  KEEP AWAY FROM SUNLIGHT]: Brand: FORMULA

## (undated) DEVICE — SOLUTION IRRIG 3000ML 0.9% SOD CHL USP UROMATIC PLAS CONT

## (undated) DEVICE — PAD,ABDOMINAL,5"X9",ST,LF,25/BX: Brand: MEDLINE INDUSTRIES, INC.

## (undated) DEVICE — SUTURE PROL SZ 2-0 L18IN NONABSORBABLE BLU FS L26MM 3/8 CIR 8685H

## (undated) DEVICE — NEEDLE SPNL L3.5IN PNK HUB S STL REG WALL FIT STYL W/ QNCKE